# Patient Record
Sex: MALE | Race: WHITE | NOT HISPANIC OR LATINO | Employment: PART TIME | URBAN - METROPOLITAN AREA
[De-identification: names, ages, dates, MRNs, and addresses within clinical notes are randomized per-mention and may not be internally consistent; named-entity substitution may affect disease eponyms.]

---

## 2021-06-03 ENCOUNTER — RECORDS - HEALTHEAST (OUTPATIENT)
Dept: LAB | Facility: CLINIC | Age: 71
End: 2021-06-03

## 2021-06-04 LAB
ANION GAP SERPL CALCULATED.3IONS-SCNC: 7 MMOL/L (ref 5–18)
BUN SERPL-MCNC: 16 MG/DL (ref 8–28)
CALCIUM SERPL-MCNC: 8.6 MG/DL (ref 8.5–10.5)
CHLORIDE BLD-SCNC: 103 MMOL/L (ref 98–107)
CO2 SERPL-SCNC: 29 MMOL/L (ref 22–31)
CREAT SERPL-MCNC: 1.16 MG/DL (ref 0.7–1.3)
ERYTHROCYTE [DISTWIDTH] IN BLOOD BY AUTOMATED COUNT: 14.1 % (ref 11–14.5)
GFR SERPL CREATININE-BSD FRML MDRD: >60 ML/MIN/1.73M2
GLUCOSE BLD-MCNC: 93 MG/DL (ref 70–125)
HCT VFR BLD AUTO: 34.2 % (ref 40–54)
HGB BLD-MCNC: 11.3 G/DL (ref 14–18)
MCH RBC QN AUTO: 30.1 PG (ref 27–34)
MCHC RBC AUTO-ENTMCNC: 33 G/DL (ref 32–36)
MCV RBC AUTO: 91 FL (ref 80–100)
PLATELET # BLD AUTO: 337 THOU/UL (ref 140–440)
PMV BLD AUTO: 9.7 FL (ref 8.5–12.5)
POTASSIUM BLD-SCNC: 4.1 MMOL/L (ref 3.5–5)
RBC # BLD AUTO: 3.75 MILL/UL (ref 4.4–6.2)
SODIUM SERPL-SCNC: 139 MMOL/L (ref 136–145)
TSH SERPL DL<=0.005 MIU/L-ACNC: 9.93 UIU/ML (ref 0.3–5)
WBC: 7.7 THOU/UL (ref 4–11)

## 2021-06-14 ENCOUNTER — RECORDS - HEALTHEAST (OUTPATIENT)
Dept: LAB | Facility: CLINIC | Age: 71
End: 2021-06-14

## 2021-06-15 LAB
ERYTHROCYTE [DISTWIDTH] IN BLOOD BY AUTOMATED COUNT: 14.1 % (ref 11–14.5)
HCT VFR BLD AUTO: 38.1 % (ref 40–54)
HGB BLD-MCNC: 12.6 G/DL (ref 14–18)
MCH RBC QN AUTO: 30.2 PG (ref 27–34)
MCHC RBC AUTO-ENTMCNC: 33.1 G/DL (ref 32–36)
MCV RBC AUTO: 91 FL (ref 80–100)
PLATELET # BLD AUTO: 441 THOU/UL (ref 140–440)
PMV BLD AUTO: 9.9 FL (ref 8.5–12.5)
RBC # BLD AUTO: 4.17 MILL/UL (ref 4.4–6.2)
WBC: 7.5 THOU/UL (ref 4–11)

## 2023-03-22 ENCOUNTER — TRANSFERRED RECORDS (OUTPATIENT)
Dept: MULTI SPECIALTY CLINIC | Facility: CLINIC | Age: 73
End: 2023-03-22

## 2023-03-22 LAB
CREATININE (EXTERNAL): 1.09 MG/DL (ref 0.73–1.18)
GFR ESTIMATED (EXTERNAL): >60 ML/MIN/1.73M2
GLUCOSE (EXTERNAL): 137 MG/DL (ref 70–100)
HBA1C MFR BLD: 6.7 %
POTASSIUM (EXTERNAL): 3.9 MMOL/L (ref 3.5–5.1)

## 2023-03-28 RX ORDER — LEVOTHYROXINE SODIUM 175 UG/1
175 TABLET ORAL DAILY
COMMUNITY
Start: 2022-12-09

## 2023-03-28 RX ORDER — ATORVASTATIN CALCIUM 20 MG/1
20 TABLET, FILM COATED ORAL DAILY
COMMUNITY
Start: 2023-03-02

## 2023-03-28 RX ORDER — INSULIN LISPRO 100 [IU]/ML
INJECTION, SOLUTION INTRAVENOUS; SUBCUTANEOUS
COMMUNITY
Start: 2023-03-16

## 2023-03-28 RX ORDER — ACETAMINOPHEN 500 MG
500-1000 TABLET ORAL EVERY 6 HOURS PRN
COMMUNITY

## 2023-03-28 RX ORDER — IRBESARTAN 150 MG/1
1 TABLET ORAL DAILY
COMMUNITY
Start: 2023-03-02

## 2023-03-29 ENCOUNTER — ANESTHESIA EVENT (OUTPATIENT)
Dept: SURGERY | Facility: CLINIC | Age: 73
DRG: 455 | End: 2023-03-29
Payer: COMMERCIAL

## 2023-03-30 ENCOUNTER — TRANSFERRED RECORDS (OUTPATIENT)
Dept: MULTI SPECIALTY CLINIC | Facility: CLINIC | Age: 73
End: 2023-03-30

## 2023-03-30 ENCOUNTER — APPOINTMENT (OUTPATIENT)
Dept: PHYSICAL THERAPY | Facility: CLINIC | Age: 73
DRG: 455 | End: 2023-03-30
Attending: ORTHOPAEDIC SURGERY
Payer: COMMERCIAL

## 2023-03-30 ENCOUNTER — APPOINTMENT (OUTPATIENT)
Dept: RADIOLOGY | Facility: CLINIC | Age: 73
DRG: 455 | End: 2023-03-30
Attending: STUDENT IN AN ORGANIZED HEALTH CARE EDUCATION/TRAINING PROGRAM
Payer: COMMERCIAL

## 2023-03-30 ENCOUNTER — HOSPITAL ENCOUNTER (INPATIENT)
Facility: CLINIC | Age: 73
LOS: 2 days | Discharge: HOME | DRG: 455 | End: 2023-04-01
Attending: ORTHOPAEDIC SURGERY | Admitting: ORTHOPAEDIC SURGERY
Payer: COMMERCIAL

## 2023-03-30 ENCOUNTER — ANESTHESIA (OUTPATIENT)
Dept: SURGERY | Facility: CLINIC | Age: 73
DRG: 455 | End: 2023-03-30
Payer: COMMERCIAL

## 2023-03-30 ENCOUNTER — APPOINTMENT (OUTPATIENT)
Dept: RADIOLOGY | Facility: CLINIC | Age: 73
DRG: 455 | End: 2023-03-30
Attending: ORTHOPAEDIC SURGERY
Payer: COMMERCIAL

## 2023-03-30 DIAGNOSIS — Z98.890 S/P SPINAL SURGERY: Primary | ICD-10-CM

## 2023-03-30 LAB
GLUCOSE BLDC GLUCOMTR-MCNC: 100 MG/DL (ref 70–99)
GLUCOSE BLDC GLUCOMTR-MCNC: 112 MG/DL (ref 70–99)
GLUCOSE BLDC GLUCOMTR-MCNC: 174 MG/DL (ref 70–99)
GLUCOSE BLDC GLUCOMTR-MCNC: 235 MG/DL (ref 70–99)
GLUCOSE BLDC GLUCOMTR-MCNC: 297 MG/DL (ref 70–99)
GLUCOSE BLDC GLUCOMTR-MCNC: 319 MG/DL (ref 70–99)
GLUCOSE BLDC GLUCOMTR-MCNC: 356 MG/DL (ref 70–99)
GLUCOSE BLDC GLUCOMTR-MCNC: 394 MG/DL (ref 70–99)
HBA1C MFR BLD: 6.4 %

## 2023-03-30 PROCEDURE — 272N000001 HC OR GENERAL SUPPLY STERILE: Performed by: ORTHOPAEDIC SURGERY

## 2023-03-30 PROCEDURE — 250N000012 HC RX MED GY IP 250 OP 636 PS 637: Performed by: STUDENT IN AN ORGANIZED HEALTH CARE EDUCATION/TRAINING PROGRAM

## 2023-03-30 PROCEDURE — 0SG00AJ FUSION OF LUMBAR VERTEBRAL JOINT WITH INTERBODY FUSION DEVICE, POSTERIOR APPROACH, ANTERIOR COLUMN, OPEN APPROACH: ICD-10-PCS | Performed by: ORTHOPAEDIC SURGERY

## 2023-03-30 PROCEDURE — 99223 1ST HOSP IP/OBS HIGH 75: CPT | Performed by: STUDENT IN AN ORGANIZED HEALTH CARE EDUCATION/TRAINING PROGRAM

## 2023-03-30 PROCEDURE — 258N000003 HC RX IP 258 OP 636

## 2023-03-30 PROCEDURE — 250N000011 HC RX IP 250 OP 636: Performed by: ORTHOPAEDIC SURGERY

## 2023-03-30 PROCEDURE — 258N000003 HC RX IP 258 OP 636: Performed by: ANESTHESIOLOGY

## 2023-03-30 PROCEDURE — 83036 HEMOGLOBIN GLYCOSYLATED A1C: CPT | Performed by: STUDENT IN AN ORGANIZED HEALTH CARE EDUCATION/TRAINING PROGRAM

## 2023-03-30 PROCEDURE — 97116 GAIT TRAINING THERAPY: CPT | Mod: GP

## 2023-03-30 PROCEDURE — 258N000003 HC RX IP 258 OP 636: Performed by: STUDENT IN AN ORGANIZED HEALTH CARE EDUCATION/TRAINING PROGRAM

## 2023-03-30 PROCEDURE — 250N000012 HC RX MED GY IP 250 OP 636 PS 637: Performed by: NURSE ANESTHETIST, CERTIFIED REGISTERED

## 2023-03-30 PROCEDURE — 97161 PT EVAL LOW COMPLEX 20 MIN: CPT | Mod: GP

## 2023-03-30 PROCEDURE — 0SG0071 FUSION OF LUMBAR VERTEBRAL JOINT WITH AUTOLOGOUS TISSUE SUBSTITUTE, POSTERIOR APPROACH, POSTERIOR COLUMN, OPEN APPROACH: ICD-10-PCS | Performed by: ORTHOPAEDIC SURGERY

## 2023-03-30 PROCEDURE — 250N000011 HC RX IP 250 OP 636

## 2023-03-30 PROCEDURE — 120N000001 HC R&B MED SURG/OB

## 2023-03-30 PROCEDURE — 97530 THERAPEUTIC ACTIVITIES: CPT | Mod: GP

## 2023-03-30 PROCEDURE — 250N000005 HC OR RX SURGIFLO HEMOSTATIC MATRIX 10ML 199102S OPNP: Performed by: ORTHOPAEDIC SURGERY

## 2023-03-30 PROCEDURE — 999N000141 HC STATISTIC PRE-PROCEDURE NURSING ASSESSMENT: Performed by: ORTHOPAEDIC SURGERY

## 2023-03-30 PROCEDURE — 0SB20ZZ EXCISION OF LUMBAR VERTEBRAL DISC, OPEN APPROACH: ICD-10-PCS | Performed by: ORTHOPAEDIC SURGERY

## 2023-03-30 PROCEDURE — 370N000017 HC ANESTHESIA TECHNICAL FEE, PER MIN: Performed by: ORTHOPAEDIC SURGERY

## 2023-03-30 PROCEDURE — 36415 COLL VENOUS BLD VENIPUNCTURE: CPT | Performed by: STUDENT IN AN ORGANIZED HEALTH CARE EDUCATION/TRAINING PROGRAM

## 2023-03-30 PROCEDURE — C1762 CONN TISS, HUMAN(INC FASCIA): HCPCS | Performed by: ORTHOPAEDIC SURGERY

## 2023-03-30 PROCEDURE — 360N000078 HC SURGERY LEVEL 5, PER MIN: Performed by: ORTHOPAEDIC SURGERY

## 2023-03-30 PROCEDURE — 250N000013 HC RX MED GY IP 250 OP 250 PS 637

## 2023-03-30 PROCEDURE — 250N000011 HC RX IP 250 OP 636: Performed by: STUDENT IN AN ORGANIZED HEALTH CARE EDUCATION/TRAINING PROGRAM

## 2023-03-30 PROCEDURE — 250N000025 HC SEVOFLURANE, PER MIN: Performed by: ORTHOPAEDIC SURGERY

## 2023-03-30 PROCEDURE — 258N000003 HC RX IP 258 OP 636: Performed by: ORTHOPAEDIC SURGERY

## 2023-03-30 PROCEDURE — 278N000051 HC OR IMPLANT GENERAL: Performed by: ORTHOPAEDIC SURGERY

## 2023-03-30 PROCEDURE — 8E0WXBF COMPUTER ASSISTED PROCEDURE OF TRUNK REGION, WITH FLUOROSCOPY: ICD-10-PCS | Performed by: ORTHOPAEDIC SURGERY

## 2023-03-30 PROCEDURE — 250N000009 HC RX 250: Performed by: ORTHOPAEDIC SURGERY

## 2023-03-30 PROCEDURE — 250N000013 HC RX MED GY IP 250 OP 250 PS 637: Performed by: STUDENT IN AN ORGANIZED HEALTH CARE EDUCATION/TRAINING PROGRAM

## 2023-03-30 PROCEDURE — 250N000009 HC RX 250

## 2023-03-30 PROCEDURE — 999N000182 XR SURGERY CARM FLUORO GREATER THAN 5 MIN: Mod: TC

## 2023-03-30 PROCEDURE — 250N000013 HC RX MED GY IP 250 OP 250 PS 637: Performed by: ANESTHESIOLOGY

## 2023-03-30 PROCEDURE — 71045 X-RAY EXAM CHEST 1 VIEW: CPT

## 2023-03-30 PROCEDURE — C1713 ANCHOR/SCREW BN/BN,TIS/BN: HCPCS | Performed by: ORTHOPAEDIC SURGERY

## 2023-03-30 PROCEDURE — 710N000010 HC RECOVERY PHASE 1, LEVEL 2, PER MIN: Performed by: ORTHOPAEDIC SURGERY

## 2023-03-30 DEVICE — IMP SCR MEDT BREAK-OFF SET SOLERA 4.75MM TI 5440030: Type: IMPLANTABLE DEVICE | Site: SPINE LUMBAR | Status: FUNCTIONAL

## 2023-03-30 DEVICE — IMPLANTABLE DEVICE: Type: IMPLANTABLE DEVICE | Site: SPINE LUMBAR | Status: FUNCTIONAL

## 2023-03-30 DEVICE — IMP ROD MEDT 3.5CM 1475501035: Type: IMPLANTABLE DEVICE | Site: SPINE LUMBAR | Status: FUNCTIONAL

## 2023-03-30 DEVICE — SPACER 6068096 CATALYFT PL LONG 9MM
Type: IMPLANTABLE DEVICE | Site: SPINE LUMBAR | Status: FUNCTIONAL
Brand: CATALYFT PL EXPANDABLE INTERBODY SYSTEM

## 2023-03-30 DEVICE — KIT BNGF 6CC DMNR CORT FBR ACCELERATE GRFTN CNN T50206: Type: IMPLANTABLE DEVICE | Site: SPINE LUMBAR | Status: FUNCTIONAL

## 2023-03-30 DEVICE — IMP SCR MEDT 4.75MM SOLERA 6.5X55MM MA 54840006555: Type: IMPLANTABLE DEVICE | Site: SPINE LUMBAR | Status: FUNCTIONAL

## 2023-03-30 RX ORDER — VANCOMYCIN HYDROCHLORIDE 1 G/20ML
INJECTION, POWDER, LYOPHILIZED, FOR SOLUTION INTRAVENOUS PRN
Status: DISCONTINUED | OUTPATIENT
Start: 2023-03-30 | End: 2023-03-30 | Stop reason: HOSPADM

## 2023-03-30 RX ORDER — DEXTROSE MONOHYDRATE 25 G/50ML
25-50 INJECTION, SOLUTION INTRAVENOUS
Status: DISCONTINUED | OUTPATIENT
Start: 2023-03-30 | End: 2023-03-30

## 2023-03-30 RX ORDER — SODIUM CHLORIDE, SODIUM LACTATE, POTASSIUM CHLORIDE, CALCIUM CHLORIDE 600; 310; 30; 20 MG/100ML; MG/100ML; MG/100ML; MG/100ML
INJECTION, SOLUTION INTRAVENOUS CONTINUOUS
Status: DISCONTINUED | OUTPATIENT
Start: 2023-03-30 | End: 2023-03-30 | Stop reason: HOSPADM

## 2023-03-30 RX ORDER — OXYCODONE HYDROCHLORIDE 5 MG/1
10 TABLET ORAL EVERY 4 HOURS PRN
Status: DISCONTINUED | OUTPATIENT
Start: 2023-03-30 | End: 2023-04-01 | Stop reason: HOSPADM

## 2023-03-30 RX ORDER — NALOXONE HYDROCHLORIDE 0.4 MG/ML
0.4 INJECTION, SOLUTION INTRAMUSCULAR; INTRAVENOUS; SUBCUTANEOUS
Status: DISCONTINUED | OUTPATIENT
Start: 2023-03-30 | End: 2023-04-01 | Stop reason: HOSPADM

## 2023-03-30 RX ORDER — DEXAMETHASONE SODIUM PHOSPHATE 10 MG/ML
INJECTION, SOLUTION INTRAMUSCULAR; INTRAVENOUS PRN
Status: DISCONTINUED | OUTPATIENT
Start: 2023-03-30 | End: 2023-03-30

## 2023-03-30 RX ORDER — MULTIVITAMIN,THERAPEUTIC
1 TABLET ORAL DAILY
Status: DISCONTINUED | OUTPATIENT
Start: 2023-03-31 | End: 2023-04-01 | Stop reason: HOSPADM

## 2023-03-30 RX ORDER — BISACODYL 10 MG
10 SUPPOSITORY, RECTAL RECTAL DAILY PRN
Status: DISCONTINUED | OUTPATIENT
Start: 2023-03-30 | End: 2023-04-01 | Stop reason: HOSPADM

## 2023-03-30 RX ORDER — HYDROMORPHONE HCL IN WATER/PF 6 MG/30 ML
0.4 PATIENT CONTROLLED ANALGESIA SYRINGE INTRAVENOUS
Status: DISCONTINUED | OUTPATIENT
Start: 2023-03-30 | End: 2023-04-01 | Stop reason: HOSPADM

## 2023-03-30 RX ORDER — CEFAZOLIN SODIUM 2 G/100ML
2 INJECTION, SOLUTION INTRAVENOUS EVERY 8 HOURS
Status: COMPLETED | OUTPATIENT
Start: 2023-03-30 | End: 2023-03-31

## 2023-03-30 RX ORDER — ONDANSETRON 2 MG/ML
4 INJECTION INTRAMUSCULAR; INTRAVENOUS EVERY 6 HOURS PRN
Status: DISCONTINUED | OUTPATIENT
Start: 2023-03-30 | End: 2023-04-01 | Stop reason: HOSPADM

## 2023-03-30 RX ORDER — ONDANSETRON 4 MG/1
4 TABLET, ORALLY DISINTEGRATING ORAL EVERY 6 HOURS PRN
Status: DISCONTINUED | OUTPATIENT
Start: 2023-03-30 | End: 2023-04-01 | Stop reason: HOSPADM

## 2023-03-30 RX ORDER — LIDOCAINE HYDROCHLORIDE 10 MG/ML
INJECTION, SOLUTION INFILTRATION; PERINEURAL PRN
Status: DISCONTINUED | OUTPATIENT
Start: 2023-03-30 | End: 2023-03-30

## 2023-03-30 RX ORDER — OXYCODONE HYDROCHLORIDE 5 MG/1
5 TABLET ORAL EVERY 4 HOURS PRN
Status: DISCONTINUED | OUTPATIENT
Start: 2023-03-30 | End: 2023-04-01 | Stop reason: HOSPADM

## 2023-03-30 RX ORDER — METHOCARBAMOL 500 MG/1
500 TABLET, FILM COATED ORAL EVERY 6 HOURS PRN
Status: DISCONTINUED | OUTPATIENT
Start: 2023-03-30 | End: 2023-04-01 | Stop reason: HOSPADM

## 2023-03-30 RX ORDER — POLYETHYLENE GLYCOL 3350 17 G/17G
17 POWDER, FOR SOLUTION ORAL DAILY
Status: DISCONTINUED | OUTPATIENT
Start: 2023-03-31 | End: 2023-04-01 | Stop reason: HOSPADM

## 2023-03-30 RX ORDER — NALOXONE HYDROCHLORIDE 0.4 MG/ML
0.2 INJECTION, SOLUTION INTRAMUSCULAR; INTRAVENOUS; SUBCUTANEOUS
Status: DISCONTINUED | OUTPATIENT
Start: 2023-03-30 | End: 2023-04-01 | Stop reason: HOSPADM

## 2023-03-30 RX ORDER — CEFAZOLIN SODIUM 2 G/100ML
2 INJECTION, SOLUTION INTRAVENOUS SEE ADMIN INSTRUCTIONS
Status: DISCONTINUED | OUTPATIENT
Start: 2023-03-30 | End: 2023-03-30

## 2023-03-30 RX ORDER — NICOTINE POLACRILEX 4 MG
15-30 LOZENGE BUCCAL
Status: DISCONTINUED | OUTPATIENT
Start: 2023-03-30 | End: 2023-04-01 | Stop reason: HOSPADM

## 2023-03-30 RX ORDER — SODIUM CHLORIDE 9 MG/ML
INJECTION, SOLUTION INTRAVENOUS CONTINUOUS
Status: DISCONTINUED | OUTPATIENT
Start: 2023-03-30 | End: 2023-04-01 | Stop reason: HOSPADM

## 2023-03-30 RX ORDER — LORATADINE 10 MG/1
10 TABLET ORAL DAILY PRN
Status: DISCONTINUED | OUTPATIENT
Start: 2023-03-30 | End: 2023-04-01 | Stop reason: HOSPADM

## 2023-03-30 RX ORDER — HYDROMORPHONE HCL IN WATER/PF 6 MG/30 ML
0.2 PATIENT CONTROLLED ANALGESIA SYRINGE INTRAVENOUS
Status: DISCONTINUED | OUTPATIENT
Start: 2023-03-30 | End: 2023-04-01 | Stop reason: HOSPADM

## 2023-03-30 RX ORDER — ACETAMINOPHEN 325 MG/1
975 TABLET ORAL ONCE
Status: COMPLETED | OUTPATIENT
Start: 2023-03-30 | End: 2023-03-30

## 2023-03-30 RX ORDER — CEFAZOLIN SODIUM/WATER 2 G/20 ML
SYRINGE (ML) INTRAVENOUS
Status: DISCONTINUED
Start: 2023-03-30 | End: 2023-03-30 | Stop reason: HOSPADM

## 2023-03-30 RX ORDER — HYDROXYZINE HYDROCHLORIDE 10 MG/1
10 TABLET, FILM COATED ORAL EVERY 6 HOURS PRN
Status: DISCONTINUED | OUTPATIENT
Start: 2023-03-30 | End: 2023-04-01 | Stop reason: HOSPADM

## 2023-03-30 RX ORDER — IRBESARTAN 150 MG/1
150 TABLET ORAL DAILY
Status: DISCONTINUED | OUTPATIENT
Start: 2023-03-31 | End: 2023-04-01 | Stop reason: HOSPADM

## 2023-03-30 RX ORDER — NICOTINE POLACRILEX 4 MG
15-30 LOZENGE BUCCAL
Status: DISCONTINUED | OUTPATIENT
Start: 2023-03-30 | End: 2023-03-30

## 2023-03-30 RX ORDER — CEFAZOLIN SODIUM/WATER 2 G/20 ML
SYRINGE (ML) INTRAVENOUS PRN
Status: DISCONTINUED | OUTPATIENT
Start: 2023-03-30 | End: 2023-03-30

## 2023-03-30 RX ORDER — ONDANSETRON 2 MG/ML
INJECTION INTRAMUSCULAR; INTRAVENOUS PRN
Status: DISCONTINUED | OUTPATIENT
Start: 2023-03-30 | End: 2023-03-30

## 2023-03-30 RX ORDER — PROPOFOL 10 MG/ML
INJECTION, EMULSION INTRAVENOUS CONTINUOUS PRN
Status: DISCONTINUED | OUTPATIENT
Start: 2023-03-30 | End: 2023-03-30

## 2023-03-30 RX ORDER — PROCHLORPERAZINE MALEATE 5 MG
5 TABLET ORAL EVERY 6 HOURS PRN
Status: DISCONTINUED | OUTPATIENT
Start: 2023-03-30 | End: 2023-04-01 | Stop reason: HOSPADM

## 2023-03-30 RX ORDER — AMOXICILLIN 250 MG
1 CAPSULE ORAL 2 TIMES DAILY
Status: DISCONTINUED | OUTPATIENT
Start: 2023-03-30 | End: 2023-04-01 | Stop reason: HOSPADM

## 2023-03-30 RX ORDER — ATORVASTATIN CALCIUM 10 MG/1
20 TABLET, FILM COATED ORAL DAILY
Status: DISCONTINUED | OUTPATIENT
Start: 2023-03-30 | End: 2023-04-01 | Stop reason: HOSPADM

## 2023-03-30 RX ORDER — LIDOCAINE 40 MG/G
CREAM TOPICAL
Status: DISCONTINUED | OUTPATIENT
Start: 2023-03-30 | End: 2023-03-30 | Stop reason: HOSPADM

## 2023-03-30 RX ORDER — CEFAZOLIN SODIUM 2 G/100ML
2 INJECTION, SOLUTION INTRAVENOUS
Status: DISCONTINUED | OUTPATIENT
Start: 2023-03-30 | End: 2023-03-30

## 2023-03-30 RX ORDER — ACETAMINOPHEN 325 MG/1
975 TABLET ORAL EVERY 8 HOURS
Status: DISCONTINUED | OUTPATIENT
Start: 2023-03-30 | End: 2023-04-01 | Stop reason: HOSPADM

## 2023-03-30 RX ORDER — CARBOXYMETHYLCELLULOSE SODIUM 10 MG/ML
GEL OPHTHALMIC PRN
Status: DISCONTINUED | OUTPATIENT
Start: 2023-03-30 | End: 2023-03-30

## 2023-03-30 RX ORDER — DEXTROSE MONOHYDRATE 25 G/50ML
25-50 INJECTION, SOLUTION INTRAVENOUS
Status: DISCONTINUED | OUTPATIENT
Start: 2023-03-30 | End: 2023-03-30 | Stop reason: HOSPADM

## 2023-03-30 RX ORDER — BUPIVACAINE HYDROCHLORIDE AND EPINEPHRINE 2.5; 5 MG/ML; UG/ML
INJECTION, SOLUTION EPIDURAL; INFILTRATION; INTRACAUDAL; PERINEURAL PRN
Status: DISCONTINUED | OUTPATIENT
Start: 2023-03-30 | End: 2023-03-30 | Stop reason: HOSPADM

## 2023-03-30 RX ORDER — ACETAMINOPHEN 325 MG/1
650 TABLET ORAL EVERY 4 HOURS PRN
Status: DISCONTINUED | OUTPATIENT
Start: 2023-04-02 | End: 2023-04-01 | Stop reason: HOSPADM

## 2023-03-30 RX ORDER — NICOTINE POLACRILEX 4 MG
15-30 LOZENGE BUCCAL
Status: DISCONTINUED | OUTPATIENT
Start: 2023-03-30 | End: 2023-03-30 | Stop reason: HOSPADM

## 2023-03-30 RX ORDER — DEXTROSE MONOHYDRATE 25 G/50ML
25-50 INJECTION, SOLUTION INTRAVENOUS
Status: DISCONTINUED | OUTPATIENT
Start: 2023-03-30 | End: 2023-04-01 | Stop reason: HOSPADM

## 2023-03-30 RX ORDER — PROPOFOL 10 MG/ML
INJECTION, EMULSION INTRAVENOUS PRN
Status: DISCONTINUED | OUTPATIENT
Start: 2023-03-30 | End: 2023-03-30

## 2023-03-30 RX ADMIN — SODIUM CHLORIDE: 9 INJECTION, SOLUTION INTRAVENOUS at 13:42

## 2023-03-30 RX ADMIN — PHENYLEPHRINE HYDROCHLORIDE 100 MCG: 10 INJECTION INTRAVENOUS at 09:03

## 2023-03-30 RX ADMIN — PHENYLEPHRINE HYDROCHLORIDE 100 MCG: 10 INJECTION INTRAVENOUS at 08:10

## 2023-03-30 RX ADMIN — HYDROXYZINE HYDROCHLORIDE 10 MG: 10 TABLET, FILM COATED ORAL at 23:09

## 2023-03-30 RX ADMIN — LIDOCAINE HYDROCHLORIDE 2 ML: 10 INJECTION, SOLUTION INFILTRATION; PERINEURAL at 10:57

## 2023-03-30 RX ADMIN — ROCURONIUM BROMIDE 50 MG: 10 INJECTION, SOLUTION INTRAVENOUS at 07:44

## 2023-03-30 RX ADMIN — PHENYLEPHRINE HYDROCHLORIDE 100 MCG: 10 INJECTION INTRAVENOUS at 09:33

## 2023-03-30 RX ADMIN — CARBOXYMETHYLCELLULOSE SODIUM 2 DROP: 10 GEL OPHTHALMIC at 07:44

## 2023-03-30 RX ADMIN — TRANEXAMIC ACID 1 G: 1 INJECTION, SOLUTION INTRAVENOUS at 07:39

## 2023-03-30 RX ADMIN — CEFAZOLIN SODIUM 2 G: 2 INJECTION, SOLUTION INTRAVENOUS at 19:05

## 2023-03-30 RX ADMIN — SODIUM CHLORIDE, POTASSIUM CHLORIDE, SODIUM LACTATE AND CALCIUM CHLORIDE: 600; 310; 30; 20 INJECTION, SOLUTION INTRAVENOUS at 08:04

## 2023-03-30 RX ADMIN — DEXAMETHASONE SODIUM PHOSPHATE 5 MG: 10 INJECTION, SOLUTION INTRAMUSCULAR; INTRAVENOUS at 07:44

## 2023-03-30 RX ADMIN — ROCURONIUM BROMIDE 20 MG: 10 INJECTION, SOLUTION INTRAVENOUS at 08:12

## 2023-03-30 RX ADMIN — PHENYLEPHRINE HYDROCHLORIDE 100 MCG: 10 INJECTION INTRAVENOUS at 10:22

## 2023-03-30 RX ADMIN — LIDOCAINE HYDROCHLORIDE 4 ML: 10 INJECTION, SOLUTION INFILTRATION; PERINEURAL at 07:44

## 2023-03-30 RX ADMIN — Medication 2 G: at 11:30

## 2023-03-30 RX ADMIN — PHENYLEPHRINE HYDROCHLORIDE 100 MCG: 10 INJECTION INTRAVENOUS at 10:28

## 2023-03-30 RX ADMIN — ROCURONIUM BROMIDE 10 MG: 10 INJECTION, SOLUTION INTRAVENOUS at 09:29

## 2023-03-30 RX ADMIN — SUGAMMADEX 200 MG: 100 INJECTION, SOLUTION INTRAVENOUS at 10:54

## 2023-03-30 RX ADMIN — PHENYLEPHRINE HYDROCHLORIDE 100 MCG: 10 INJECTION INTRAVENOUS at 08:37

## 2023-03-30 RX ADMIN — LEVOTHYROXINE SODIUM 175 MCG: 0.05 TABLET ORAL at 15:08

## 2023-03-30 RX ADMIN — PHENYLEPHRINE HYDROCHLORIDE 100 MCG: 10 INJECTION INTRAVENOUS at 09:21

## 2023-03-30 RX ADMIN — OXYCODONE HYDROCHLORIDE 5 MG: 5 TABLET ORAL at 13:35

## 2023-03-30 RX ADMIN — ACETAMINOPHEN 975 MG: 325 TABLET ORAL at 15:09

## 2023-03-30 RX ADMIN — MIDAZOLAM 2 MG: 1 INJECTION INTRAMUSCULAR; INTRAVENOUS at 07:38

## 2023-03-30 RX ADMIN — PHENYLEPHRINE HYDROCHLORIDE 100 MCG: 10 INJECTION INTRAVENOUS at 08:36

## 2023-03-30 RX ADMIN — SODIUM CHLORIDE, POTASSIUM CHLORIDE, SODIUM LACTATE AND CALCIUM CHLORIDE: 600; 310; 30; 20 INJECTION, SOLUTION INTRAVENOUS at 07:01

## 2023-03-30 RX ADMIN — OXYCODONE HYDROCHLORIDE 5 MG: 5 TABLET ORAL at 19:11

## 2023-03-30 RX ADMIN — ACETAMINOPHEN 975 MG: 325 TABLET ORAL at 06:57

## 2023-03-30 RX ADMIN — PHENYLEPHRINE HYDROCHLORIDE 100 MCG: 10 INJECTION INTRAVENOUS at 09:09

## 2023-03-30 RX ADMIN — PHENYLEPHRINE HYDROCHLORIDE 100 MCG: 10 INJECTION INTRAVENOUS at 10:13

## 2023-03-30 RX ADMIN — SENNOSIDES AND DOCUSATE SODIUM 1 TABLET: 50; 8.6 TABLET ORAL at 20:02

## 2023-03-30 RX ADMIN — ONDANSETRON 4 MG: 2 INJECTION INTRAMUSCULAR; INTRAVENOUS at 10:33

## 2023-03-30 RX ADMIN — SODIUM CHLORIDE, POTASSIUM CHLORIDE, SODIUM LACTATE AND CALCIUM CHLORIDE: 600; 310; 30; 20 INJECTION, SOLUTION INTRAVENOUS at 10:51

## 2023-03-30 RX ADMIN — PROPOFOL 150 MG: 10 INJECTION, EMULSION INTRAVENOUS at 07:44

## 2023-03-30 RX ADMIN — INSULIN GLARGINE 25 UNITS: 100 INJECTION, SOLUTION SUBCUTANEOUS at 20:48

## 2023-03-30 RX ADMIN — ATORVASTATIN CALCIUM 20 MG: 10 TABLET, FILM COATED ORAL at 15:09

## 2023-03-30 RX ADMIN — HYDROMORPHONE HYDROCHLORIDE 0.5 MG: 1 INJECTION, SOLUTION INTRAMUSCULAR; INTRAVENOUS; SUBCUTANEOUS at 08:29

## 2023-03-30 RX ADMIN — ROCURONIUM BROMIDE 30 MG: 10 INJECTION, SOLUTION INTRAVENOUS at 08:16

## 2023-03-30 RX ADMIN — OXYCODONE HYDROCHLORIDE 5 MG: 5 TABLET ORAL at 23:09

## 2023-03-30 RX ADMIN — ACETAMINOPHEN 975 MG: 325 TABLET ORAL at 23:09

## 2023-03-30 RX ADMIN — INSULIN ASPART 2 UNITS: 100 INJECTION, SOLUTION INTRAVENOUS; SUBCUTANEOUS at 11:02

## 2023-03-30 RX ADMIN — HYDROMORPHONE HYDROCHLORIDE 0.5 MG: 1 INJECTION, SOLUTION INTRAMUSCULAR; INTRAVENOUS; SUBCUTANEOUS at 08:02

## 2023-03-30 RX ADMIN — PROPOFOL 100 MCG/KG/MIN: 10 INJECTION, EMULSION INTRAVENOUS at 07:49

## 2023-03-30 RX ADMIN — INSULIN ASPART 2 UNITS: 100 INJECTION, SOLUTION INTRAVENOUS; SUBCUTANEOUS at 17:20

## 2023-03-30 RX ADMIN — Medication 2 G: at 07:35

## 2023-03-30 RX ADMIN — PHENYLEPHRINE HYDROCHLORIDE 100 MCG: 10 INJECTION INTRAVENOUS at 08:18

## 2023-03-30 ASSESSMENT — ACTIVITIES OF DAILY LIVING (ADL)
ADLS_ACUITY_SCORE: 21
ADLS_ACUITY_SCORE: 22
ADLS_ACUITY_SCORE: 21
ADLS_ACUITY_SCORE: 22
ADLS_ACUITY_SCORE: 22
ADLS_ACUITY_SCORE: 21
ADLS_ACUITY_SCORE: 22

## 2023-03-30 NOTE — PROVIDER NOTIFICATION
Notified Dr Chau of blood sugar of 235. No orders in for BS correction. Requesting orders for insulin. Awaiting for orders.

## 2023-03-30 NOTE — PROGRESS NOTES
03/30/23 0729   Appointment Info   Signing Clinician's Name / Credentials (PT) Elinor Romero, PT, DPT   Living Environment   People in Home alone   Current Living Arrangements mobile home   Home Accessibility stairs to enter home;stairs within home   Number of Stairs, Main Entrance 5   Stair Railings, Main Entrance railings safe and in good condition;railings on both sides of stairs   Number of Stairs, Within Home, Primary one   Stair Railings, Within Home, Primary railings safe and in good condition;railing on left side (ascending)   Transportation Anticipated family or friend will provide   Self-Care   Usual Activity Tolerance excellent   Current Activity Tolerance good   Equipment Currently Used at Home none  (Has FWW at home)   Fall history within last six months no   Activity/Exercise/Self-Care Comment Patient reports previous independence with mobility and ADLs. Works 3 jobs, does drive. Does own cooking, cleaning, and laundry.   General Information   Onset of Illness/Injury or Date of Surgery 03/30/23   Referring Physician Buzz Jara MD   Patient/Family Therapy Goals Statement (PT) To go home   Pertinent History of Current Problem (include personal factors and/or comorbidities that impact the POC) Status post L4-5 fusion   Existing Precautions/Restrictions fall;spinal   Weight-Bearing Status - LLE weight-bearing as tolerated   Weight-Bearing Status - RLE weight-bearing as tolerated   Cognition   Affect/Mental Status (Cognition) WNL   Orientation Status (Cognition) oriented x 4   Follows Commands (Cognition) WNL   Range of Motion (ROM)   Range of Motion ROM is WNL   Strength (Manual Muscle Testing)   Strength (Manual Muscle Testing) strength is WNL   Bed Mobility   Bed Mobility supine-sit   Supine-Sit [place] (Bed Mobility) verbal cues;supervision   Transfers   Transfers sit-stand transfer   Sit-Stand Transfer   Sit-Stand [place] (Transfers) contact guard;1 person to manage equipment    Assistive Device (Sit-Stand Transfers) walker, front-wheeled   Gait/Stairs (Locomotion)   [place] Level (Gait) supervision   Assistive Device (Gait) walker, front-wheeled   Distance in Feet 10   Distance in Feet (Gait) 315   Pattern (Gait) step-through   Deviations/Abnormal Patterns (Gait) gait speed decreased   Clinical Impression   Criteria for Skilled Therapeutic Intervention Yes, treatment indicated   PT Diagnosis (PT) Impaired functional mobility   Influenced by the following impairments Post op status   Functional limitations due to impairments Bed mobility, transfers, gait, stairs   Clinical Presentation (PT Evaluation Complexity) Evolving/Changing   Clinical Presentation Rationale Patient presents as medically diagnosed   Clinical Decision Making (Complexity) low complexity   Planned Therapy Interventions (PT) bed mobility training;gait training;patient/family education;stair training;transfer training   Anticipated Equipment Needs at Discharge (PT) walker, rolling   Risk & Benefits of therapy have been explained evaluation/treatment results reviewed;care plan/treatment goals reviewed;participants voiced agreement with care plan;participants included;patient   PT Total Evaluation Time   PT Eval, Low Complexity Minutes (29163) 10   Physical Therapy Goals   PT Frequency 2x/day   PT Predicted Duration/Target Date for Goal Attainment 04/06/23   PT Goals Transfers;Gait;Stairs   PT: Transfers Modified independent;Sit to/from stand;Bed to/from chair;Assistive device;Within precautions   PT: Gait Modified independent;Assistive device;Greater than 200 feet   PT: Stairs Supervision/stand-by assist;5 stairs;Rail on both sides   Interventions   Interventions Quick Adds Gait Training;Therapeutic Activity   Therapeutic Activity   Therapeutic Activities: dynamic activities to improve functional performance Minutes (78832) 8   Symptoms Noted During/After Treatment None   Treatment Detail/Skilled Intervention Patient  supine in bed on arrival. Educated patient on spinal precautions and weight bearing status. Verbal cueing for hand placement and sequencing for bed mobility, assistance with line and linen management. Upon sitting, PT noticed patient's drain had become disconnected, notified RN. Sitting EOB with SBA x 1 with no LOB noted. Stand to sit with SBA x 1 and FWW. Verbal cueing for hand placement and to maintain spinal precautions. Sit to supine with SBA x 1, verbal cueing for hand placement and sequencing. Patient supine in bed with call light within reach and bed alarm armed at end of session.   Gait Training   Gait Training Minutes (44516) 8   Symptoms Noted During/After Treatment (Gait Training) none   Treatment Detail/Skilled Intervention Patient ambulated in hallway with SBA x 1 and FWW, assistance with line management. No LOB noted during gait, demonstrated decreased gait speed.   [place] Level (Gait Training) stand-by assist   Physical Assistance Level (Gait Training) supervision   Weight Bearing (Gait Training) weight-bearing as tolerated   Assistive Device (Gait Training) rolling walker   Gait Analysis Deviations decreased zulema   PT Discharge Planning   PT Plan Bed mobility, transfers, gait, stairs   PT Discharge Recommendation (DC Rec) home;home with assist   PT Rationale for DC Rec Patient currently requires SBA for bed mobility, transfers, and gait. He lives in a mobile home alone and his son will be staying with him upon discharge from the hospital. He has 5 stairs to enter his home; at this time he has not attempted stairs. Anticipate patient will be safe to discharge home with assist from family once medically cleared to do so.   PT Brief overview of current status SBA for bed mobility, transfers, and gait.   Total Session Time   Timed Code Treatment Minutes 16   Total Session Time (sum of timed and untimed services) 26     Elinor Romero, PT, DPT

## 2023-03-30 NOTE — PROVIDER NOTIFICATION
Notified again of increased blood sugar of 297. Orders have been placed for insulin. See MAR for medications given. Also given verbal order for 500 mL bolus of fluids from Dr Chau.

## 2023-03-30 NOTE — PHARMACY-ADMISSION MEDICATION HISTORY
Pharmacy Note - Admission Medication History    Pertinent Provider Information: n/a   ______________________________________________________________________    Prior To Admission (PTA) med list completed and updated in EMR.       PTA Med List   Medication Sig Note Last Dose     acetaminophen (TYLENOL) 500 MG tablet Take 500-1,000 mg by mouth every 6 hours as needed for mild pain  3/14/2023     aspirin (ASA) 81 MG EC tablet Take 81 mg by mouth daily  3/27/2023     atorvastatin (LIPITOR) 20 MG tablet Take 20 mg by mouth daily  3/29/2023 at am     insulin lispro (HUMALOG) 100 UNIT/ML vial Used in insulin pump 3/30/2023: Typically 55-65 units/day      irbesartan (AVAPRO) 150 MG tablet Take 1 tablet by mouth daily 3/28/2023: Hold AM of surgery per H&P 3/29/2023 at am     levothyroxine (SYNTHROID/LEVOTHROID) 175 MCG tablet Take 175 mcg by mouth daily  3/29/2023 at am     Information source(s): Patient    Method of interview communication: in-person    Patient was asked about OTC/herbal products specifically.  PTA med list reflects this.    Based on the pharmacist's assessment, the PTA med list information appears reliable    Medication Affordability:  Not including over the counter (OTC) medications, was there a time in the past 12 months when you did not take your medications as prescribed because of cost?: No    Allergies were reviewed, assessed, and updated with the patient.      Patient does not anticipate needing any multi-use medications during admission.     Thank you for the opportunity to participate in the care of this patient.    Melissa Mckinney, PharmD, BCPS     3/30/2023     7:01 AM

## 2023-03-30 NOTE — ANESTHESIA PROCEDURE NOTES
Airway         Procedure Start/Stop Times: 3/30/2023 7:45 AM  Staff -        CRNA: Nara Srivastava APRN CRNA       Performed By: anesthesiologist  Consent for Airway        Urgency: elective  Indications and Patient Condition       Indications for airway management: ishan-procedural and airway protection       Induction type:intravenous       Mask difficulty assessment: 1 - vent by mask    Final Airway Details       Final airway type: endotracheal airway       Successful airway: ETT - single  Endotracheal Airway Details        ETT size (mm): 8.0       Cuffed: yes       Cuff volume (mL): 5       Successful intubation technique: direct laryngoscopy       DL Blade Type: MAC 4       Grade View of Cords: 2 (Long)       Adjucts: bougie       Position: Right       Measured from: lips       Secured at (cm): 23       Bite block used: Soft    Post intubation assessment        Placement verified by: capnometry, equal breath sounds and chest rise        Number of attempts at approach: 1       Number of other approaches attempted: 0       Secured with: silk tape       Ease of procedure: easy       Dentition: Intact and Unchanged    Medication(s) Administered   Medication Administration Time: 3/30/2023 7:45 AM

## 2023-03-30 NOTE — ANESTHESIA PREPROCEDURE EVALUATION
Anesthesia Pre-Procedure Evaluation    Patient: Kraig Chan   MRN: [id number] : 1950        Procedure : Procedure(s):  RIGHT LUMBAR 4-5 TRANSFORAMINAL LUMBAR INTERBODY FUSION WITH LAMINECTOMY USING STEALTH NAVIGATION          Past Medical History:   Diagnosis Date     Arthritis      Diabetes (H)      Other chronic pain      Thyroid disease       Past Surgical History:   Procedure Laterality Date     EYE SURGERY       ORTHOPEDIC SURGERY        Allergies   Allergen Reactions     Sulfa Drugs Unknown      Social History     Tobacco Use     Smoking status: Never     Smokeless tobacco: Never   Substance Use Topics     Alcohol use: Never      Wt Readings from Last 1 Encounters:   23 81.8 kg (180 lb 6.4 oz)        Anesthesia Evaluation   Pt has had prior anesthetic.     No history of anesthetic complications       ROS/MED HX  ENT/Pulmonary:  - neg pulmonary ROS     Neurologic:  - neg neurologic ROS     Cardiovascular:  - neg cardiovascular ROS     METS/Exercise Tolerance:     Hematologic:     (+) anemia (mild, 12.8 at last check),     Musculoskeletal:       GI/Hepatic:  - neg GI/hepatic ROS     Renal/Genitourinary:  - neg Renal ROS     Endo:     (+) type I DM (has insulin pump (but pump site and CGM monitor are placed on lower abdomen, will need to be removed for procedure)), thyroid problem,     Psychiatric/Substance Use:       Infectious Disease:       Malignancy:       Other:      (+) , H/O Chronic Pain (not on opioids per patient - only takes tylenol),        Physical Exam    Airway        Mallampati: II   TM distance: > 3 FB   Neck ROM: full   Mouth opening: > 3 cm    Respiratory Devices and Support         Dental       (+) Minor Abnormalities - some fillings, tiny chips      Cardiovascular          Rhythm and rate: regular and normal     Pulmonary           breath sounds clear to auscultation           OUTSIDE LABS:  CBC:   Lab Results   Component Value Date    WBC 7.5 06/15/2021    WBC 7.7  06/04/2021    HGB 12.6 (L) 06/15/2021    HGB 11.3 (L) 06/04/2021    HCT 38.1 (L) 06/15/2021    HCT 34.2 (L) 06/04/2021     (H) 06/15/2021     06/04/2021     BMP:   Lab Results   Component Value Date     06/04/2021    POTASSIUM 4.1 06/04/2021    CHLORIDE 103 06/04/2021    CO2 29 06/04/2021    BUN 16 06/04/2021    CR 1.16 06/04/2021    GLC 93 06/04/2021     COAGS: No results found for: PTT, INR, FIBR  POC: No results found for: BGM, HCG, HCGS  HEPATIC: No results found for: ALBUMIN, PROTTOTAL, ALT, AST, GGT, ALKPHOS, BILITOTAL, BILIDIRECT, SHITAL  OTHER:   Lab Results   Component Value Date    ELLIE 8.6 06/04/2021    TSH 9.93 (H) 06/04/2021       Anesthesia Plan    ASA Status:  2   NPO Status:  NPO Appropriate    Anesthesia Type: General.     - Airway: ETT   Induction: Intravenous.           Consents    Anesthesia Plan(s) and associated risks, benefits, and realistic alternatives discussed. Questions answered and patient/representative(s) expressed understanding.     - Discussed: Risks, Benefits and Alternatives for the PROCEDURE were discussed     - Discussed with:  Patient         Postoperative Care    Pain management: IV analgesics, Oral pain medications.   PONV prophylaxis: Ondansetron (or other 5HT-3), Dexamethasone or Solumedrol     Comments:    Other Comments: BG check q 1 hr, insulin drip if correction needed            Arjun Chau MD

## 2023-03-30 NOTE — PLAN OF CARE
Patient vital signs are at baseline: Yes  Patient able to ambulate as they were prior to admission or with assist devices provided by therapies during their stay:  Yes  Patient MUST void prior to discharge:  No,  Reason:  Donahue removed - due to void  Patient able to tolerate oral intake:  Yes  Pain has adequate pain control using Oral analgesics:  Yes  Does patient have an identified :  Yes  Has goal D/C date and time been discussed with patient:  Yes    Walked in the madsen with PT. Pain controlled with scheduled Tylenol, prn Oxycodone, and ice packs. Tolerating regular diet. Donahue removed this afternoon. Due to void.

## 2023-03-30 NOTE — ANESTHESIA CARE TRANSFER NOTE
Patient: Kraig Chan    Procedure: Procedure(s):  RIGHT LUMBAR 4-5 TRANSFORAMINAL LUMBAR INTERBODY FUSION WITH LAMINECTOMY USING STEALTH NAVIGATION       Diagnosis: Lumbar spinal stenosis [M48.061]  Spondylolisthesis [M43.10]  Diagnosis Additional Information: No value filed.    Anesthesia Type:   General     Note:    Oropharynx: oropharynx clear of all foreign objects  Level of Consciousness: drowsy  Oxygen Supplementation: face mask  Level of Supplemental Oxygen (L/min / FiO2): 8  Independent Airway: airway patency satisfactory and stable  Dentition: dentition unchanged  Vital Signs Stable: post-procedure vital signs reviewed and stable  Report to RN Given: handoff report given  Patient transferred to: PACU    Handoff Report: Identifed the Patient, Identified the Reponsible Provider, Reviewed the pertinent medical history, Discussed the surgical course, Reviewed Intra-OP anesthesia mangement and issues during anesthesia, Set expectations for post-procedure period and Allowed opportunity for questions and acknowledgement of understanding      Vitals:  Vitals Value Taken Time   /78 03/30/23 1144   Temp 36.4  C (97.5  F) 03/30/23 1144   Pulse 88 03/30/23 1145   Resp 12 03/30/23 1145   SpO2 100 % 03/30/23 1145   Vitals shown include unvalidated device data.    Electronically Signed By: ANUJ Lopez CRNA  March 30, 2023  11:47 AM

## 2023-03-30 NOTE — ANESTHESIA POSTPROCEDURE EVALUATION
Patient: Kraig Chan    Procedure: Procedure(s):  RIGHT LUMBAR 4-5 TRANSFORAMINAL LUMBAR INTERBODY FUSION WITH LAMINECTOMY USING STEALTH NAVIGATION       Anesthesia Type:  General    Note:     Postop Pain Control: Uneventful            Sign Out: Well controlled pain   PONV: No   Neuro/Psych: Uneventful            Sign Out: Acceptable/Baseline neuro status   Airway/Respiratory: Uneventful            Sign Out: Acceptable/Baseline resp. status   CV/Hemodynamics: Uneventful            Sign Out: Acceptable CV status; No obvious hypovolemia; No obvious fluid overload   Other NRE: NONE   DID A NON-ROUTINE EVENT OCCUR? No    Event details/Postop Comments:  Patient prefers to use sliding-scale insulin for now, instead of re-applying insulin pump.           Last vitals:  Vitals Value Taken Time   /58 03/30/23 1240   Temp 36.4  C (97.5  F) 03/30/23 1144   Pulse 101 03/30/23 1249   Resp 24 03/30/23 1249   SpO2 96 % 03/30/23 1249   Vitals shown include unvalidated device data.    Electronically Signed By: Arjun Chau MD  March 30, 2023  1:23 PM

## 2023-03-30 NOTE — OP NOTE
DATE OF SERVICE: 03/30/2023     PREOPERATIVE DIAGNOSES:   1.  Severe spinal stenosis at L4-5  2.  L4-5 spondylolisthesis   3.  Failure of conservative measures      POSTOPERATIVE DIAGNOSES:   1.  Severe spinal stenosis at L4-5  2.  L4-5 spondylolisthesis   3.  Failure of conservative measures      PROCEDURE:   1. Right-sided transforaminal/transfacet decompression of the exiting L4 and traversing L5 nerve roots.   2. Transforaminal lumbar interbody fusion L4-5 with autograft bone, Medtronic Catalyft interbody long 9  3. Posterior lateral fusion on the left at L4-5.   4. Segmental pedicle screw fixation in the pedicles of L4 and L5 bilaterally  5. Complete laminectomy at L4-5  6. O arm with intra operative navigation     [employer] navigated pedicle screws systems.      SURGEON: Dr. Buzz Jara.      ASSISTANT: CINDY Fajardo, who was needed for patient positioning, soft tissue retraction, patient safety and assistance with closure of the wound.  He was vital in the protection of the nerve roots as well as the dura.  This could only be performed by a surgical PA trained in spine     ESTIMATED BLOOD LOSS: 100 mL     DRAINS: Hemovac      COMPLICATIONS: None perceived.      SPECIMENS SENT: None.      HISTORY OF PRESENT ILLNESS AND INDICATIONS FOR PROCEDURE:   This is a pleasant 72 year old male that presented with severe buttock and leg pain.  MRI demonstrated L4-5 severe central stenosis with L4-5 spondylolisthesis and L4-5 degenerative disc disease.  He underwent non-operative treatment with injections as well as therapy with no improvement long term.  We discussed because of this that he was a candidate for surgery.  I discussed that a fusion would give him more reliable results given spondylolisthesis.  He wished to proceed with the fusion.  We discussed the risks and benefits which include but are not limited to bleeding, infection, damage to the nerve or dura, failure of procedure to provide pain  relief, foot drop, iatrogenic instability, pseudoarthrosis, need for additional procedures, and risks associated with anesthesia.  The intention of the surgery is to treat leg pain and will not reliably treat any back pain. He was completely clear on this.  Following this, they would like to proceed with the fusion.       DESCRIPTION OF PROCEDURE     Therefore, the patient was brought to the operating room. He was intubated via general endotracheal anesthetic and placed on a Naman table with a Juanjo frame, care taken to pad all of her bony prominences. Pause for the Cause was performed correctly identifying the patient, procedure, proposed plan and radiographs and all were in agreement. We then dissected down over the L4 hemilamina bilaterally. We dissected over the facet joints at L4-5 bilaterally.  In addition to this, the transverse process was visualized at the L4 and L5 level.  O arm was brought in and a tracer was placed on the L5 spinous process.  An intraoperative CT scan was taken.       We then turned our attention to placing pedicle screws, first using a [place] bur, then a pedicle probe, then a Delgado probe to palpate the cannulated pedicle. We then tapped the holes to 5.5 mm.  We then used the Delgado probe again to palpate all 4 quadrants of our cannulated pedicle. We then placed 6.5x55 screws at L4 and 6.5x55 screws in L5.   A subsequent O arm spin was performed and demonstrated the screws in good position.     We then turned our attention to the decompression and interbody at L4-5.  A laminotomy was made.  The right L4 pars was scored allowing for the removal of the descending articular process of L4 along with the ascending articular process of L5. This gave us our transforaminal/transfacet decompression of the exiting L4, traversing L5 nerve root.      At the disk space level, we performed a box annulotomy at L4-5, used a combination of ODC curettes, pituitary rongeurs and Kerrison rongeurs to  remove the disk material. We took great care not to dig into the endplates. When all of the disk material that we could remove was taken out, we gently scored the endplates and placed autograft bone. We then placed the interbody graft,  a 27 mm length and 9 height Medtronic Catalyft cage. This had good fit and fill.     We then performed a complete laminectomy and medial facetectomy.  Thick ligamentum and epidural fat was encountered and removed in a piecemeal fashion to completely free the left lateral recess at L4-5.          We then decorticated our contralateral facet joint at L4-5 as well as the transverse processes and placed additional autograft in the defect.  This was mixed with a medium [place] biologic and additional autograft to assist in posterolateral fusion.       We then placed our connectors and rods and tightened to the 's specifications. We irrigated the wound copiously, had good hemostasis. Vancomycin was placed over the hardware.  The fascia was closed with #1 Vicryl, subcutaneous tissue with 2-0 Vicryl, skin with a 3-0 monocryl.  A sterile dressing was applied. The patient tolerated the procedure well. There were no apparent complications.      He will be weightbearing as tolerated bilateral lower extremities with strict instructions to avoid bending, lifting and twisting over the next 6 weeks. They may have a corset type brace for comfort and have early mobilization and MONIK stockings for DVT prophylaxis. He will have 2 grams of Ancef IV q.8 hours x2 doses for antibiotics or until drain is removed. Return to see me in 2 weeks, sooner if there are any problems, questions or concerns.     Hardware used:  Medtronic Solera screws and rods with end caps  Medtronic Catalyft cage  Harjinder
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
## 2023-03-30 NOTE — PLAN OF CARE
"Goal Outcome Evaluation:         Patient alert and orientated to room, phone, call light.  Patient reported pain in back 5, given prn 5mg oxycodone with relief, able to rest post.  Patient's oxygen saturations decreased to 80s on room air during nap, placed on 2L per NC and oxygen saturations increased to 90s.  Instructed on IS and encouraged when awake.  Patient reports bilateral foot numbness, tingling at baseline, able to lift, move legs.  Patient reports having type 1 diabetes, Dr. Mills following, plan sliding scale insulin.  Patient's back dressing clean, dry, intact.  Patient's daughter at bedside.  VSS, moste recent: BP (!) 144/78 (BP Location: Right arm)   Pulse 98   Temp 97.8  F (36.6  C) (Oral)   Resp 18   Ht 1.88 m (6' 2\")   Wt 81.8 kg (180 lb 6.4 oz)   SpO2 99%   BMI 23.16 kg/m   on 2L per nasal cannula.                 "

## 2023-03-31 ENCOUNTER — APPOINTMENT (OUTPATIENT)
Dept: OCCUPATIONAL THERAPY | Facility: CLINIC | Age: 73
DRG: 455 | End: 2023-03-31
Attending: ORTHOPAEDIC SURGERY
Payer: COMMERCIAL

## 2023-03-31 ENCOUNTER — APPOINTMENT (OUTPATIENT)
Dept: PHYSICAL THERAPY | Facility: CLINIC | Age: 73
DRG: 455 | End: 2023-03-31
Attending: ORTHOPAEDIC SURGERY
Payer: COMMERCIAL

## 2023-03-31 LAB
GLUCOSE BLDC GLUCOMTR-MCNC: 218 MG/DL (ref 70–99)
GLUCOSE BLDC GLUCOMTR-MCNC: 272 MG/DL (ref 70–99)
GLUCOSE BLDC GLUCOMTR-MCNC: 295 MG/DL (ref 70–99)
GLUCOSE BLDC GLUCOMTR-MCNC: 307 MG/DL (ref 70–99)
GLUCOSE BLDC GLUCOMTR-MCNC: 341 MG/DL (ref 70–99)
HGB BLD-MCNC: 11 G/DL (ref 13.3–17.7)

## 2023-03-31 PROCEDURE — 97110 THERAPEUTIC EXERCISES: CPT | Mod: GP

## 2023-03-31 PROCEDURE — 120N000001 HC R&B MED SURG/OB

## 2023-03-31 PROCEDURE — 250N000013 HC RX MED GY IP 250 OP 250 PS 637: Performed by: STUDENT IN AN ORGANIZED HEALTH CARE EDUCATION/TRAINING PROGRAM

## 2023-03-31 PROCEDURE — 85018 HEMOGLOBIN: CPT | Performed by: STUDENT IN AN ORGANIZED HEALTH CARE EDUCATION/TRAINING PROGRAM

## 2023-03-31 PROCEDURE — 97535 SELF CARE MNGMENT TRAINING: CPT | Mod: GO

## 2023-03-31 PROCEDURE — 36415 COLL VENOUS BLD VENIPUNCTURE: CPT | Performed by: STUDENT IN AN ORGANIZED HEALTH CARE EDUCATION/TRAINING PROGRAM

## 2023-03-31 PROCEDURE — 97166 OT EVAL MOD COMPLEX 45 MIN: CPT | Mod: GO

## 2023-03-31 PROCEDURE — 97116 GAIT TRAINING THERAPY: CPT | Mod: GP

## 2023-03-31 PROCEDURE — 99232 SBSQ HOSP IP/OBS MODERATE 35: CPT | Performed by: STUDENT IN AN ORGANIZED HEALTH CARE EDUCATION/TRAINING PROGRAM

## 2023-03-31 PROCEDURE — 250N000011 HC RX IP 250 OP 636: Performed by: STUDENT IN AN ORGANIZED HEALTH CARE EDUCATION/TRAINING PROGRAM

## 2023-03-31 RX ORDER — OXYCODONE HYDROCHLORIDE 5 MG/1
5 TABLET ORAL EVERY 4 HOURS PRN
Qty: 30 TABLET | Refills: 0 | Status: ON HOLD | OUTPATIENT
Start: 2023-03-31 | End: 2024-03-13

## 2023-03-31 RX ORDER — AMOXICILLIN 250 MG
1 CAPSULE ORAL 2 TIMES DAILY
Status: ON HOLD
Start: 2023-03-31 | End: 2024-03-13

## 2023-03-31 RX ORDER — DEXTROSE MONOHYDRATE 25 G/50ML
25-50 INJECTION, SOLUTION INTRAVENOUS
Status: DISCONTINUED | OUTPATIENT
Start: 2023-03-31 | End: 2023-03-31

## 2023-03-31 RX ORDER — HYDROXYZINE HYDROCHLORIDE 10 MG/1
10 TABLET, FILM COATED ORAL EVERY 6 HOURS PRN
Qty: 20 TABLET | Refills: 0 | Status: SHIPPED | OUTPATIENT
Start: 2023-03-31

## 2023-03-31 RX ORDER — METHOCARBAMOL 500 MG/1
500 TABLET, FILM COATED ORAL EVERY 6 HOURS PRN
Qty: 20 TABLET | Refills: 0 | Status: ON HOLD | OUTPATIENT
Start: 2023-03-31 | End: 2024-03-13

## 2023-03-31 RX ORDER — NICOTINE POLACRILEX 4 MG
15-30 LOZENGE BUCCAL
Status: DISCONTINUED | OUTPATIENT
Start: 2023-03-31 | End: 2023-03-31

## 2023-03-31 RX ADMIN — METHOCARBAMOL 500 MG: 500 TABLET ORAL at 11:36

## 2023-03-31 RX ADMIN — LEVOTHYROXINE SODIUM 175 MCG: 0.05 TABLET ORAL at 08:30

## 2023-03-31 RX ADMIN — POLYETHYLENE GLYCOL 3350 17 G: 17 POWDER, FOR SOLUTION ORAL at 08:30

## 2023-03-31 RX ADMIN — OXYCODONE HYDROCHLORIDE 5 MG: 5 TABLET ORAL at 08:27

## 2023-03-31 RX ADMIN — ACETAMINOPHEN 975 MG: 325 TABLET ORAL at 15:13

## 2023-03-31 RX ADMIN — ATORVASTATIN CALCIUM 20 MG: 10 TABLET, FILM COATED ORAL at 08:29

## 2023-03-31 RX ADMIN — IRBESARTAN 150 MG: 150 TABLET ORAL at 08:37

## 2023-03-31 RX ADMIN — SENNOSIDES AND DOCUSATE SODIUM 1 TABLET: 50; 8.6 TABLET ORAL at 08:28

## 2023-03-31 RX ADMIN — THERA TABS 1 TABLET: TAB at 08:30

## 2023-03-31 RX ADMIN — ACETAMINOPHEN 975 MG: 325 TABLET ORAL at 06:50

## 2023-03-31 RX ADMIN — CEFAZOLIN SODIUM 2 G: 2 INJECTION, SOLUTION INTRAVENOUS at 03:22

## 2023-03-31 RX ADMIN — SENNOSIDES AND DOCUSATE SODIUM 1 TABLET: 50; 8.6 TABLET ORAL at 21:09

## 2023-03-31 ASSESSMENT — ACTIVITIES OF DAILY LIVING (ADL)
ADLS_ACUITY_SCORE: 22

## 2023-03-31 NOTE — PLAN OF CARE
Occupational Therapy Discharge Summary    Reason for therapy discharge:    All goals and outcomes met, no further needs identified.    Progress towards therapy goal(s). See goals on Care Plan in [facility] electronic health record for goal details.  Goals met    Therapy recommendation(s):    No further therapy is recommended.

## 2023-03-31 NOTE — PROGRESS NOTES
Care Management Follow Up    Length of Stay (days): 1    Expected Discharge Date: 04/01/2023     Concerns to be Addressed:     Discharge needs.    Additional Information:  CM reviewed chart, patient lives alone at baseline. Son will be staying with him post op and assisting as needed. Therapy recommending Home with assist. Anticipate he will return home with assistance from family. Family to provide a ride at discharge.      Jennifer Lynn RN

## 2023-03-31 NOTE — PROGRESS NOTES
"  Spine Surgery Progress Note    POD #: 1  S/P: Procedure(s):  RIGHT LUMBAR 4-5 TRANSFORAMINAL LUMBAR INTERBODY FUSION WITH LAMINECTOMY USING STEALTH NAVIGATION    Assessment:  S/P Procedure(s):  RIGHT LUMBAR 4-5 TRANSFORAMINAL LUMBAR INTERBODY FUSION WITH LAMINECTOMY USING STEALTH NAVIGATION on 3/30/2023            Plan:  - Continue PT/OT, appreciate recs  -Advance diet as tolerated  -Ambulate as tolerated; no bending, lifting, or twisting over next 6 weeks.  - Pain Management continue current regimen  - Anticoagulation: SCDs, travis stockings and early ambulation.  - Labs: hgb 11.0, transfuse if <7.0. No indication today  - Remove HV drain once output is <30 cc for 2 consecutive nursing shifts (8-hr shifts) -- keep drain in for at least one more shift as tubing became detached overnight and readings may be inaccurate.  - Follow-up: Outpatient follow up in 2 weeks  - Disposition: Patient plans to d/c home pending medical clearance, progression with therapies and removal of drain. Patient's son will be helping them with post-op and incision cares.     Subjective:  Patient is seen this morning sitting bedside. Appears comfortable. Incision pain that is well controlled.  Lower extremity pain noticeably improved compared with prior to surgery  No new extremity pain, numbness tingling or weakness to report  Patient reports that drain tubing became detached during the night.  (-) Nausea  (-) Vomiting  (+) Flatus  (-) BM    Hemovac drain in place      Objective:  /59 (BP Location: Right arm, Patient Position: Sitting, Cuff Size: Adult Regular)   Pulse 87   Temp 98.2  F (36.8  C) (Oral)   Resp 18   Ht 1.88 m (6' 2\")   Wt 81.8 kg (180 lb 6.4 oz)   SpO2 95%   BMI 23.16 kg/m        Incision covered with dressing. Gauze dressing in place at . Appears clean, dry, intact. Without any sign of infection.  Awake, alert and oriented. Patient is sitting bedside. Conversational  Non-labored breathing    RLE   5/5 hip " flexors  5/5 quadriceps  5/5 tibialis anterior  5/5 EHL  5/5 Gastroc Soleus  RLE L2-S1 intact to light touch    LLE   5/5 hip flexors  5/5 quadriceps  5/5 tibialis anterior  5/5 EHL  5/5 Gastroc Soleus  LLE L2-S1 intact to light touch    bilateral calves non-tender, non-edematous, non-erythematous, no warmth    Drain in place, without signs of infection: output 25 --> 10cc (drain tubing was detached throughout the night, readings may be inaccurate)      Labs:  Recent Labs   Lab Test 03/31/23  0523 06/15/21  0524 06/04/21  0507   WBC  --  7.5 7.7   RBC  --  4.17* 3.75*   HGB 11.0* 12.6* 11.3*   HCT  --  38.1* 34.2*   MCV  --  91 91   MCH  --  30.2 30.1   MCHC  --  33.1 33.0   PLT  --  441* 337     Recent Labs   Lab Test 06/04/21  0507   POTASSIUM 4.1   CHLORIDE 103   BUN 16         Krissy Oh PA-C, [facility]  Date: 3/31/2023  Time: 8:09 AM  [facility]

## 2023-03-31 NOTE — PROGRESS NOTES
03/31/23 0835   Appointment Info   Signing Clinician's Name / Credentials (OT) ALEJO Goddard   Living Environment   People in Home alone   Current Living Arrangements mobile home   Transportation Anticipated family or friend will provide   Living Environment Comments Pt FWW, walkin shower w/ shower chair, RTS   Self-Care   Usual Activity Tolerance good   Current Activity Tolerance good   Equipment Currently Used at Home none   Fall history within last six months no   Activity/Exercise/Self-Care Comment Pt IND w/ ADLs and IADLs - works 3 jobs   General Information   Onset of Illness/Injury or Date of Surgery 03/30/23   Referring Physician Buzz Jara MD   Patient/Family Therapy Goal Statement (OT) go home today or tomorrow   Additional Occupational Profile Info/Pertinent History of Current Problem s/p L4-L5 fusion   Existing Precautions/Restrictions spinal   Cognitive Status Examination   Orientation Status orientation to person, place and time   Visual Perception   Visual Impairment/Limitations corrective lenses full-time   Sensory   Sensory Comments numbness in feet from neuropathy at baseline   Pain Assessment   Patient Currently in Pain Yes, see Vital Sign flowsheet   Posture   Posture not impaired   Range of Motion Comprehensive   General Range of Motion no range of motion deficits identified   Strength Comprehensive (MMT)   General Manual Muscle Testing (MMT) Assessment no strength deficits identified   Muscle Tone Assessment   Muscle Tone Quick Adds No deficits were identified   Coordination   Upper Extremity Coordination No deficits were identified   Bed Mobility   Bed Mobility supine-sit;sit-supine   Comment (Bed Mobility) SBA-CGA for bed mobility   Transfers   Transfers bed-chair transfer;toilet transfer;shower transfer   Transfer Comments SBA-CGa for transfers   Activities of Daily Living   BADL Assessment/Intervention bathing;lower body dressing;toileting;grooming   Bathing  Assessment/Intervention   [place] Level (Bathing) minimum assist (75% patient effort)   Lower Body Dressing Assessment/Training   [place] Level (Lower Body Dressing) minimum assist (75% patient effort)   Grooming Assessment/Training   [place] Level (Grooming) contact guard assist   Toileting   [place] Level (Toileting) supervision   Clinical Impression   Criteria for Skilled Therapeutic Interventions Met (OT) Yes, treatment indicated   OT Diagnosis decreased ADLs   Influenced by the following impairments lumbar fusion   OT Problem List-Impairments impacting ADL activity tolerance impaired;pain;post-surgical precautions   Assessment of Occupational Performance 3-5 Performance Deficits   Identified Performance Deficits LE dressing, bed mobility, all transfers, bathing, g/h, toileting   Planned Therapy Interventions (OT) ADL retraining;bed mobility training;transfer training   Clinical Decision Making Complexity (OT) moderate complexity   Risk & Benefits of therapy have been explained evaluation/treatment results reviewed;patient   OT Total Evaluation Time   OT Eval, Moderate Complexity Minutes (96909) 10   OT Goals   Therapy Frequency (OT) One time eval and treatment   OT Predicted Duration/Target Date for Goal Attainment 03/31/23   OT Goals Lower Body Dressing;Bed Mobility;Transfers;Toilet Transfer/Toileting   OT: Lower Body Dressing Modified independent;within precautions;Goal Met;Completed   OT: Bed Mobility Modified independent;supine to/from sitting;rolling;bridging;within precautions;Goal Met;Completed   OT: Transfer Modified independent;with assistive device;within precautions;Goal Met;Completed  (walkin shower)   OT: Toilet Transfer/Toileting Modified independent;toilet transfer;cleaning and garment management;within precautions;Goal Met;Completed   Interventions   Interventions Quick Adds Self-Care/Home Management   Self-Care/Home Management   Self-Care/Home Mgmt/ADL, Compensatory, Meal  Prep Minutes (75348) 25   Symptoms Noted During/After Treatment (Meal Preparation/Planning Training) none   Treatment Detail/Skilled Intervention Pt edu on spinal px - pt verbalized understanding. Pt edu on log roll technique for bed mobility - completed Mod I. Pt instructed on compensatory strategies for LE dressing - completed Mod I while seated EOB using figure 4 position. Recommend pt get reacher for home to maintain spinal px. Pt STS Mod I w/ FWW and amb. 20 ft to BR w/ FWW Mod I. Pt edu on safety technique for walkin shower transfer - pt completed SBA - recommended pt use extended scrubber at home. Pt edu on safe RTS transfer - completed  Mod I. Edu on compensatory strategies for g/h tasks while standing at sink. Edu handout given on ADLs w/in spinal px. Edu on car transfers - pt verbalized understanding. Pt ended session in recliner.   OT Discharge Planning   OT Plan DC OT   OT Discharge Recommendation (DC Rec) (S)  home with assist   OT Rationale for DC Rec Pt tolerating therapy well. Pt will have son staying w/ him for 1 week and his daughter can stay w/ him for additional time if needed. Pt has good setup at home. Recommend pt get reacher and extended scrubber for home.   OT Brief overview of current status Mod I for ADLs   Total Session Time   Timed Code Treatment Minutes 25   Total Session Time (sum of timed and untimed services) 35

## 2023-03-31 NOTE — PLAN OF CARE

## 2023-03-31 NOTE — PROGRESS NOTES
[facility]    Medicine Progress Note - Hospitalist Service    Date of Admission:  3/30/2023    Assessment & Plan   Kraig Chan is a 72 year old male admitted on 3/30/2023. He has a past medical history of diabetes mellitus type 1, hypothyroidism, retention, hyperlipidemia, who is status post right lumbar transforaminal interbody fusion with laminectomy using Stealth navigation by Dr. Jara on 3/30/2023 with EBL of 100 cc. Internal Medicine consulted for diabetes and thyroid disease.  Patient is seen on the floors.  Preop labs 3/22/2023 include hemoglobin A1c 6.7, normal BMP including creatinine 1.09, hemoglobin 12.8, platelets 208.     On consultation, patient is hemodynamically vitally stable, though mildly tachycardic at 102; on evaluation heart rate 99.  No new symptoms.  He paused his insulin pump before surgery; he prefers our sliding scale and basal bolus regimen, which is ordered.  Additionally he is requiring 2.5 L, so obtaining chest x-ray but also recommend outpatient sleep study as he has a history of snoring and nighttime relative hypoxia per daughter at bedside.    CXR without acute culprit findings. On POD1, remains stable on room air. Some elevated sugars though in setting of opting for conservative basal+bolus insulin (instead of pump) upon pt/daughter preference - increasing bolus and corrective scale 3/31/2023. Cleared from IM standpoint, suspect pending drainage improvement; as usual the final disposition per surgery and therapies. We do recommend sleep study as outpatient-- discussed with pt + his daughter.    - - - - -    Status post back surgery  Assessment: As above.  Plan:  - -DVT prophylaxis and pain management per primary service  -We will monitor for complications including respiratory concerns, urinary retention, ileus, skin reactions, infections, medication side effects, etc.  -Incentive spirometry   -discharge disposition per primary  service    Hyperlipidemia  A/P-on atorvastatin, continue    -Hypertension  Assessment: Normotensive pressures postoperatively.  Patient is on irbesartan once daily.    Plan:  -Continue    Hypothyroidism  Assessment: On levothyroxine 175 mcg daily  Plan:  -Continue    Diabetes mellitus type 1  Assessment: On regimen of insulin pump.  He uses about 55 units daily.  As such, we will start a basal regimen of 25 units Lantus, and 3 times daily with meals 8 units along with a corrective LDSSI scale.  Plan:  - Increase basal to 28u   -  INCREASE bolus mealtime doses to 10units  - INCREASE to High Corrective scale insulin  -Okay to continue home pump on discharge  -Hypoglycemia protocol       Diet: Consistent Carbohydrate Diet Moderate Consistent Carb (60 g CHO per Meal) Diet  Discharge Instruction - Regular Diet Adult    DVT Prophylaxis: Defer to primary service  Donahue Catheter: Not present  Lines: None     Cardiac Monitoring: None  Code Status: Full Code      Clinically Significant Risk Factors                                 Disposition Plan      Expected Discharge Date: 04/01/2023      Destination: home with family            Timothy Mills MD  Hospitalist Service  [facility]  Securely message with [employer] (more info)  Text page via [employer] Paging/Directory   ______________________________________________________________________    Interval History   -no acute interval events  -seen in early morning, no new symptoms or shortness of breath.  -We discussed disposition per primary team  -passing gas   -All questions answered    Physical Exam   Vital Signs: Temp: 98.2  F (36.8  C) Temp src: Oral BP: 118/59 Pulse: 87   Resp: 18 SpO2: 95 % O2 Device: None (Room air) Oxygen Delivery: 2 LPM  Weight: 180 lbs 6.4 oz    General: alert, oriented, and in no acute distress; drain stable  Pulmonary: clear to auscultation bilaterally, normal respiratory effort, on room air, no rales or wheezes or evidence of  accessory muscle use  CVS: regular rate and rhythm, no murmurs, rubs, or gallops; no blatant jugular venous distention; no extremity edema and extremities are warm to the touch  GI: soft, nontender, BS+, no rebound or guarding, no conspicuous organomegaly   Neuro: nonfocal, moves all extremities of own volition  Psych: appropriate         Medical Decision Making       40 MINUTES SPENT BY ME on the date of service doing chart review, history, exam, documentation & further activities per the note.      Data     I have personally reviewed the following data over the past 24 hrs:    N/A  \   11.0 (L)   / N/A     N/A N/A N/A /  307 (H)   N/A N/A N/A \       TSH: N/A T4: N/A A1C: 6.4 (H)       Imaging results reviewed over the past 24 hrs:   Recent Results (from the past 24 hour(s))   XR Chest Port 1 View    Narrative    EXAM: XR CHEST PORT 1 VIEW  LOCATION: [facility]  DATE/TIME: 3/30/2023 3:30 PM    INDICATION: hypoxia, please assess for any infiltrates, thank you  COMPARISON: 8/24/2021      Impression    IMPRESSION: The heart is enlarged.  Left basilar atelectasis.  Small left pleural effusion.  No pneumothorax.

## 2023-03-31 NOTE — PLAN OF CARE
"  Problem: Spinal Surgery  Goal: Optimal Coping with Surgery  Outcome: Progressing   Goal Outcome Evaluation:                      /69 (BP Location: Right arm)   Pulse 89   Temp 98.1  F (36.7  C) (Oral)   Resp 18   Ht 1.88 m (6' 2\")   Wt 81.8 kg (180 lb 6.4 oz)   SpO2 95%   BMI 23.16 kg/m      Patient vital signs are at baseline: Yes  Patient able to ambulate as they were prior to admission or with assist devices provided by therapies during their stay:  Yes  Patient MUST void prior to discharge:  Yes  Patient able to tolerate oral intake:  Yes  Pain has adequate pain control using Oral analgesics:  Yes  Does patient have an identified :  Yes  Has goal D/C date and time been discussed with patient:  Yes    Ricky Parkinson RN      "

## 2023-04-01 VITALS
OXYGEN SATURATION: 94 % | RESPIRATION RATE: 16 BRPM | HEIGHT: 74 IN | TEMPERATURE: 98.5 F | BODY MASS INDEX: 23.15 KG/M2 | SYSTOLIC BLOOD PRESSURE: 123 MMHG | WEIGHT: 180.4 LBS | DIASTOLIC BLOOD PRESSURE: 69 MMHG | HEART RATE: 99 BPM

## 2023-04-01 LAB
GLUCOSE BLDC GLUCOMTR-MCNC: 135 MG/DL (ref 70–99)
HGB BLD-MCNC: 11.5 G/DL (ref 13.3–17.7)

## 2023-04-01 PROCEDURE — 85018 HEMOGLOBIN: CPT | Performed by: STUDENT IN AN ORGANIZED HEALTH CARE EDUCATION/TRAINING PROGRAM

## 2023-04-01 PROCEDURE — 250N000013 HC RX MED GY IP 250 OP 250 PS 637: Performed by: STUDENT IN AN ORGANIZED HEALTH CARE EDUCATION/TRAINING PROGRAM

## 2023-04-01 PROCEDURE — 36415 COLL VENOUS BLD VENIPUNCTURE: CPT | Performed by: STUDENT IN AN ORGANIZED HEALTH CARE EDUCATION/TRAINING PROGRAM

## 2023-04-01 PROCEDURE — 99232 SBSQ HOSP IP/OBS MODERATE 35: CPT | Performed by: STUDENT IN AN ORGANIZED HEALTH CARE EDUCATION/TRAINING PROGRAM

## 2023-04-01 RX ADMIN — POLYETHYLENE GLYCOL 3350 17 G: 17 POWDER, FOR SOLUTION ORAL at 08:11

## 2023-04-01 RX ADMIN — ACETAMINOPHEN 975 MG: 325 TABLET ORAL at 08:09

## 2023-04-01 RX ADMIN — IRBESARTAN 150 MG: 150 TABLET ORAL at 08:12

## 2023-04-01 RX ADMIN — SENNOSIDES AND DOCUSATE SODIUM 1 TABLET: 50; 8.6 TABLET ORAL at 08:11

## 2023-04-01 RX ADMIN — LEVOTHYROXINE SODIUM 175 MCG: 0.05 TABLET ORAL at 08:10

## 2023-04-01 RX ADMIN — ATORVASTATIN CALCIUM 20 MG: 10 TABLET, FILM COATED ORAL at 08:09

## 2023-04-01 RX ADMIN — THERA TABS 1 TABLET: TAB at 08:11

## 2023-04-01 RX ADMIN — ACETAMINOPHEN 975 MG: 325 TABLET ORAL at 00:25

## 2023-04-01 ASSESSMENT — ACTIVITIES OF DAILY LIVING (ADL)
ADLS_ACUITY_SCORE: 22

## 2023-04-01 NOTE — PLAN OF CARE
Goal Outcome Evaluation:      Problem: Spinal Surgery  Goal: Optimal Coping with Surgery  Outcome: Progressing     Problem: Plan of Care - These are the overarching goals to be used throughout the patient stay.    Goal: Plan of Care Review  Description: The Plan of Care Review/Shift note should be completed every shift.  The Outcome Evaluation is a brief statement about your assessment that the patient is improving, declining, or no change.  This information will be displayed automatically on your shift note.  Outcome: Progressing     Patient reports pain 3/10 which is controlled with scheduled Tylenol.  Patient is ambulating, voiding and tolerating a diabetic diet.  VSS.  A & O .  No output from hemovac. Pt will likely discharge home today.            Patient vital signs are at baseline: Yes  Patient able to ambulate as they were prior to admission or with assist devices provided by therapies during their stay:  Yes  Patient MUST void prior to discharge:  Yes  Patient able to tolerate oral intake:  Yes  Pain has adequate pain control using Oral analgesics:  Yes  Does patient have an identified :  Yes  Has goal D/C date and time been discussed with patient:  Yes

## 2023-04-01 NOTE — PLAN OF CARE
Goal Outcome Evaluation:      Plan of Care Reviewed With: patient    Overall Patient Progress: improving  Patient vital signs are at baseline: Yes  Patient able to ambulate as they were prior to admission or with assist devices provided by therapies during their stay:  Yes  Patient MUST void prior to discharge:  Yes  Patient able to tolerate oral intake:  Yes  Pain has adequate pain control using Oral analgesics:  Yes  Does patient have an identified :  Yes  Has goal D/C date and time been discussed with patient:  Yes

## 2023-04-01 NOTE — PROGRESS NOTES
"Ortho Progress Note      Post-operative Day: 2 Days Post-Op  S/P Procedure(s):  RIGHT LUMBAR 4-5 TRANSFORAMINAL LUMBAR INTERBODY FUSION WITH LAMINECTOMY USING STEALTH NAVIGATION      Subjective:  Pain: mild  Chest pain, SOB:  no  Nausea, vomiting:  no  Lightheadedness, dizziness:  no  Neuro:  Patient denies new onset numbness or paresthesias    Patient up in bed this morning.  Doing well, pain controlled.  Had worked yesterday with therapies without issues.  No headaches.  No other complaints this morning. Patient planning to go home with daughter.     Objective:  /69 (BP Location: Right arm)   Pulse 99   Temp 98.5  F (36.9  C) (Oral)   Resp 16   Ht 1.88 m (6' 2\")   Wt 81.8 kg (180 lb 6.4 oz)   SpO2 94%   BMI 23.16 kg/m    Gen: A&O x 3. NAD. Appears comfortable.   Lumbar spine examined.  Incision covered with dressing.  C/d/i.   Intact motor function in plantar/dorsiflexion of ankles and EHL bilaterally  Sensation grossly intact in L2-S1 dermatomes bilateral lower extremities  Feet warm and well perfused  Brisk cap refill    Drain:  10 cc    Pertinent Labs   Lab Results: personally reviewed.   No results found for: INR, PROTIME  Lab Results   Component Value Date    WBC 7.5 06/15/2021    HGB 11.5 (L) 04/01/2023    HCT 38.1 (L) 06/15/2021    MCV 91 06/15/2021     (H) 06/15/2021     Lab Results   Component Value Date     06/04/2021    CO2 29 06/04/2021       Assessment: 2 days s/p lumbar fusion by Dr. Jara    Plan:   Continue PT/OT  Corset brace as needed when up  Remove drain  WBAT  No lifting, bending or twisting x 6 weeks  Anticoagulation: SCDs, travis stockings and early ambulation.  Discharge planning: home today      Report completed by:  Shawna Dong PA-C  Date: 4/1/2023  Time: 9:20 AM    "

## 2023-04-01 NOTE — PLAN OF CARE
Patient vital signs are at baseline: Yes  Patient able to ambulate as they were prior to admission or with assist devices provided by therapies during their stay:  Yes  Patient MUST void prior to discharge:  Yes  Patient able to tolerate oral intake:  Yes  Pain has adequate pain control using Oral analgesics:  Yes  Does patient have an identified :  Yes  Has goal D/C date and time been discussed with patient:  Yes Goal Outcome Evaluation:      Plan of Care Reviewed With: patient    Overall Patient Progress: improvingOverall Patient Progress: improving  pt remains stable. Nil output in the Hemovac. Ambulating with one assist, for potential discharge tomorrow.

## 2023-04-01 NOTE — PROGRESS NOTES
[facility]    Medicine Progress Note - Hospitalist Service    Date of Admission:  3/30/2023    Assessment & Plan   Kraig Chan is a 72 year old male admitted on 3/30/2023. He has a past medical history of diabetes mellitus type 1, hypothyroidism, retention, hyperlipidemia, who is status post right lumbar transforaminal interbody fusion with laminectomy using Stealth navigation by Dr. Jara on 3/30/2023 with EBL of 100 cc. Internal Medicine consulted for diabetes and thyroid disease.  Patient is seen on the floors.  Preop labs 3/22/2023 include hemoglobin A1c 6.7, normal BMP including creatinine 1.09, hemoglobin 12.8, platelets 208.     On consultation, patient is hemodynamically vitally stable, though mildly tachycardic at 102; on evaluation heart rate 99.  No new symptoms.  He paused his insulin pump before surgery; he prefers our sliding scale and basal bolus regimen, which is ordered.  Additionally he is requiring 2.5 L, so obtaining chest x-ray but also recommend outpatient sleep study as he has a history of snoring and nighttime relative hypoxia per daughter at bedside. CXR without acute culprit findings. On POD1, remains stable on room air. Labile sugars though in setting of opting for conservative basal+bolus insulin (instead of home pump) upon pt/daughter preference - still titrating/increasing bolus/basal regimen.     Stable, remains cleared from IM standpoint, pending drainage improvement; as usual the final disposition per surgery and therapies. We do recommend sleep study as outpatient-- discussed with pt + his daughter.   - - - - -    Status post back surgery  Assessment: As above.  Plan:  - -DVT prophylaxis and pain management per primary service  -We will monitor for complications including respiratory concerns, urinary retention, ileus, skin reactions, infections, medication side effects, etc.   -Incentive spirometry   -discharge disposition per primary  service    Hyperlipidemia  A/P-on atorvastatin, continue    -Hypertension  Assessment: Normotensive pressures postoperatively.  Patient is on irbesartan once daily.    Plan:  -Continue    Hypothyroidism  Assessment: On levothyroxine 175 mcg daily  Plan:  -Continue    Diabetes mellitus type 1  Assessment: On regimen of insulin pump.  He uses about 55 units daily.  As such, we started a basal regimen of 25 units Lantus, and 3 times daily with meals 8 units along with a corrective LDSSI scale.  Plan:  - Increase basal to 30u  -  INCREASE bolus mealtime doses to 12units  - INCREASE to High Corrective scale insulin  -Okay to continue home pump on discharge  -Hypoglycemia protocol       Diet: Consistent Carbohydrate Diet Moderate Consistent Carb (60 g CHO per Meal) Diet  Discharge Instruction - Regular Diet Adult    DVT Prophylaxis: Defer to primary service  Donahue Catheter: Not present  Lines: None     Cardiac Monitoring: None  Code Status: Full Code      Clinically Significant Risk Factors                                 Disposition Plan     Expected Discharge Date: 04/01/2023      Destination: home with family            Timothy Mills MD  Hospitalist Service  [facility]  Securely message with Ideapod (more info)  Text page via [employer] Paging/Directory   ______________________________________________________________________    Interval History   -no acute interval events  -seen in early morning, no new symptoms or shortness of breath.  -We discussed disposition per primary team; his daughter will pick him   -passing gas still  -All questions answered    Physical Exam   Vital Signs: Temp: 98.1  F (36.7  C) Temp src: Oral BP: 132/72 Pulse: 98   Resp: 18 SpO2: 92 % O2 Device: None (Room air)    Weight: 180 lbs 6.4 oz     General: alert, oriented, and in no acute distress; drain stable  Pulmonary: clear to auscultation bilaterally, normal respiratory effort, on room air, no rales or wheezes or  evidence of accessory muscle use  CVS: regular rate and rhythm, no murmurs, rubs, or gallops; no blatant jugular venous distention; no extremity edema and extremities are warm to the touch  GI: soft, nontender, BS+, no rebound or guarding, no conspicuous organomegaly   Neuro: nonfocal, moves all extremities of own volition  Psych: appropriate       Medical Decision Making       35 MINUTES SPENT BY ME on the date of service doing chart review, history, exam, documentation & further activities per the note.      Data     I have personally reviewed the following data over the past 24 hrs:    N/A  \   11.5 (L)   / N/A     N/A N/A N/A /  272 (H)   N/A N/A N/A \       Imaging results reviewed over the past 24 hrs:   No results found for this or any previous visit (from the past 24 hour(s)).

## 2023-04-04 NOTE — DISCHARGE SUMMARY
ORTHOPEDIC HOSPITAL DISCHARGE SUMMARY    Patient Name: Kraig Chan  YOB: 1950 Age: 72 year old  Medical Record Number: [id number]  Primary Physician: Colton Contreras  Admission Date: 3/30/2023  Discharge Date: 4/1/2023  1:10 PM      PRINCIPAL DIAGNOSIS CAUSING ADMISSION: Lumbar spinal stenosis [M48.061]  Spondylolisthesis [M43.10]  S/P spinal surgery [Z98.890]    Patient Active Hospital Problem List:    DISCHARGE MEDICATIONS  ASA for DVT prophylaxis    BRIEF HOSPITAL COURSE: This 72 year old male underwent RIGHT LUMBAR 4-5 TRANSFORAMINAL LUMBAR INTERBODY FUSION WITH LAMINECTOMY USING STEALTH NAVIGATION     PROCEDURES PERFORMED: RIGHT LUMBAR 4-5 TRANSFORAMINAL LUMBAR INTERBODY FUSION WITH LAMINECTOMY USING STEALTH NAVIGATION on 3/30/2023    COMPLICATIONS IN HOSPITAL: None    Brief Summary:  Kraig Chan was admitted on 3/30/2023  for Procedure(s):  RIGHT LUMBAR 4-5 TRANSFORAMINAL LUMBAR INTERBODY FUSION WITH LAMINECTOMY USING STEALTH NAVIGATION    There were no intraoperative or perioperative complications. Post operatively Kraig Chan was progressed to general diet, physical therapy and pain protocols and was subsequently discharged on 4/1/2023  1:10 PM.    Further details as per the medical record.      Discharge instruction and meds were given per the after discharge order set and the patient was to return to clinic in 1-2 weeks for routine followup.        Buzz Jara MD, M.D.  [facility]

## 2024-03-13 ENCOUNTER — HOSPITAL ENCOUNTER (OUTPATIENT)
Facility: HOSPITAL | Age: 74
Discharge: HOME | End: 2024-03-13
Attending: SURGERY | Admitting: SURGERY
Payer: COMMERCIAL

## 2024-03-13 ENCOUNTER — ANESTHESIA EVENT (OUTPATIENT)
Dept: SURGERY | Facility: HOSPITAL | Age: 74
End: 2024-03-13
Payer: COMMERCIAL

## 2024-03-13 ENCOUNTER — ANESTHESIA (OUTPATIENT)
Dept: SURGERY | Facility: HOSPITAL | Age: 74
End: 2024-03-13
Payer: COMMERCIAL

## 2024-03-13 VITALS
RESPIRATION RATE: 20 BRPM | BODY MASS INDEX: 23.52 KG/M2 | WEIGHT: 183.2 LBS | HEART RATE: 57 BPM | SYSTOLIC BLOOD PRESSURE: 108 MMHG | OXYGEN SATURATION: 96 % | TEMPERATURE: 97.5 F | DIASTOLIC BLOOD PRESSURE: 59 MMHG

## 2024-03-13 DIAGNOSIS — R22.2 MASS OF TORSO: Primary | ICD-10-CM

## 2024-03-13 LAB — GLUCOSE BLDC GLUCOMTR-MCNC: 89 MG/DL (ref 70–99)

## 2024-03-13 PROCEDURE — 370N000017 HC ANESTHESIA TECHNICAL FEE, PER MIN: Performed by: SURGERY

## 2024-03-13 PROCEDURE — 250N000011 HC RX IP 250 OP 636: Performed by: SURGERY

## 2024-03-13 PROCEDURE — 999N000141 HC STATISTIC PRE-PROCEDURE NURSING ASSESSMENT: Performed by: SURGERY

## 2024-03-13 PROCEDURE — 250N000009 HC RX 250: Performed by: NURSE ANESTHETIST, CERTIFIED REGISTERED

## 2024-03-13 PROCEDURE — 258N000003 HC RX IP 258 OP 636: Performed by: NURSE ANESTHETIST, CERTIFIED REGISTERED

## 2024-03-13 PROCEDURE — 258N000003 HC RX IP 258 OP 636: Performed by: ANESTHESIOLOGY

## 2024-03-13 PROCEDURE — 82962 GLUCOSE BLOOD TEST: CPT

## 2024-03-13 PROCEDURE — 250N000009 HC RX 250: Performed by: SURGERY

## 2024-03-13 PROCEDURE — 250N000013 HC RX MED GY IP 250 OP 250 PS 637: Performed by: ANESTHESIOLOGY

## 2024-03-13 PROCEDURE — 88304 TISSUE EXAM BY PATHOLOGIST: CPT | Mod: TC | Performed by: SURGERY

## 2024-03-13 PROCEDURE — 272N000001 HC OR GENERAL SUPPLY STERILE: Performed by: SURGERY

## 2024-03-13 PROCEDURE — 710N000012 HC RECOVERY PHASE 2, PER MINUTE: Performed by: SURGERY

## 2024-03-13 PROCEDURE — 360N000075 HC SURGERY LEVEL 2, PER MIN: Performed by: SURGERY

## 2024-03-13 PROCEDURE — 250N000011 HC RX IP 250 OP 636: Performed by: NURSE ANESTHETIST, CERTIFIED REGISTERED

## 2024-03-13 RX ORDER — NALOXONE HYDROCHLORIDE 1 MG/ML
0.1 INJECTION INTRAMUSCULAR; INTRAVENOUS; SUBCUTANEOUS
Status: DISCONTINUED | OUTPATIENT
Start: 2024-03-13 | End: 2024-03-13 | Stop reason: HOSPADM

## 2024-03-13 RX ORDER — ONDANSETRON 4 MG/1
4 TABLET, ORALLY DISINTEGRATING ORAL EVERY 30 MIN PRN
Status: DISCONTINUED | OUTPATIENT
Start: 2024-03-13 | End: 2024-03-13 | Stop reason: HOSPADM

## 2024-03-13 RX ORDER — ONDANSETRON 2 MG/ML
4 INJECTION INTRAMUSCULAR; INTRAVENOUS EVERY 30 MIN PRN
Status: DISCONTINUED | OUTPATIENT
Start: 2024-03-13 | End: 2024-03-13 | Stop reason: HOSPADM

## 2024-03-13 RX ORDER — FENTANYL CITRATE 50 UG/ML
50 INJECTION, SOLUTION INTRAMUSCULAR; INTRAVENOUS EVERY 5 MIN PRN
Status: DISCONTINUED | OUTPATIENT
Start: 2024-03-13 | End: 2024-03-13 | Stop reason: HOSPADM

## 2024-03-13 RX ORDER — SODIUM CHLORIDE, SODIUM LACTATE, POTASSIUM CHLORIDE, CALCIUM CHLORIDE 600; 310; 30; 20 MG/100ML; MG/100ML; MG/100ML; MG/100ML
INJECTION, SOLUTION INTRAVENOUS CONTINUOUS
Status: DISCONTINUED | OUTPATIENT
Start: 2024-03-13 | End: 2024-03-13 | Stop reason: HOSPADM

## 2024-03-13 RX ORDER — CEFAZOLIN SODIUM/WATER 2 G/20 ML
2 SYRINGE (ML) INTRAVENOUS
Status: COMPLETED | OUTPATIENT
Start: 2024-03-13 | End: 2024-03-13

## 2024-03-13 RX ORDER — MAGNESIUM HYDROXIDE 1200 MG/15ML
LIQUID ORAL PRN
Status: DISCONTINUED | OUTPATIENT
Start: 2024-03-13 | End: 2024-03-13 | Stop reason: HOSPADM

## 2024-03-13 RX ORDER — CEFAZOLIN SODIUM/WATER 2 G/20 ML
2 SYRINGE (ML) INTRAVENOUS SEE ADMIN INSTRUCTIONS
Status: DISCONTINUED | OUTPATIENT
Start: 2024-03-13 | End: 2024-03-13 | Stop reason: HOSPADM

## 2024-03-13 RX ORDER — LIDOCAINE HYDROCHLORIDE 10 MG/ML
INJECTION, SOLUTION INFILTRATION; PERINEURAL PRN
Status: DISCONTINUED | OUTPATIENT
Start: 2024-03-13 | End: 2024-03-13

## 2024-03-13 RX ORDER — PROPOFOL 10 MG/ML
INJECTION, EMULSION INTRAVENOUS PRN
Status: DISCONTINUED | OUTPATIENT
Start: 2024-03-13 | End: 2024-03-13

## 2024-03-13 RX ORDER — FENTANYL CITRATE 50 UG/ML
25 INJECTION, SOLUTION INTRAMUSCULAR; INTRAVENOUS EVERY 5 MIN PRN
Status: DISCONTINUED | OUTPATIENT
Start: 2024-03-13 | End: 2024-03-13 | Stop reason: HOSPADM

## 2024-03-13 RX ORDER — FENTANYL CITRATE 50 UG/ML
25 INJECTION, SOLUTION INTRAMUSCULAR; INTRAVENOUS
Status: DISCONTINUED | OUTPATIENT
Start: 2024-03-13 | End: 2024-03-13 | Stop reason: HOSPADM

## 2024-03-13 RX ORDER — ONDANSETRON 2 MG/ML
INJECTION INTRAMUSCULAR; INTRAVENOUS PRN
Status: DISCONTINUED | OUTPATIENT
Start: 2024-03-13 | End: 2024-03-13

## 2024-03-13 RX ORDER — ACETAMINOPHEN 325 MG/1
975 TABLET ORAL ONCE
Status: COMPLETED | OUTPATIENT
Start: 2024-03-13 | End: 2024-03-13

## 2024-03-13 RX ORDER — FENTANYL CITRATE 50 UG/ML
INJECTION, SOLUTION INTRAMUSCULAR; INTRAVENOUS PRN
Status: DISCONTINUED | OUTPATIENT
Start: 2024-03-13 | End: 2024-03-13

## 2024-03-13 RX ORDER — HYDROCODONE BITARTRATE AND ACETAMINOPHEN 5; 325 MG/1; MG/1
1-2 TABLET ORAL EVERY 4 HOURS PRN
Qty: 15 TABLET | Refills: 0 | Status: SHIPPED | OUTPATIENT
Start: 2024-03-13

## 2024-03-13 RX ORDER — OXYCODONE HYDROCHLORIDE 5 MG/1
5 TABLET ORAL
Status: DISCONTINUED | OUTPATIENT
Start: 2024-03-13 | End: 2024-03-13 | Stop reason: HOSPADM

## 2024-03-13 RX ORDER — BUPIVACAINE HYDROCHLORIDE AND EPINEPHRINE 2.5; 5 MG/ML; UG/ML
INJECTION, SOLUTION INFILTRATION; PERINEURAL PRN
Status: DISCONTINUED | OUTPATIENT
Start: 2024-03-13 | End: 2024-03-13 | Stop reason: HOSPADM

## 2024-03-13 RX ORDER — PROPOFOL 10 MG/ML
INJECTION, EMULSION INTRAVENOUS CONTINUOUS PRN
Status: DISCONTINUED | OUTPATIENT
Start: 2024-03-13 | End: 2024-03-13

## 2024-03-13 RX ORDER — LIDOCAINE 40 MG/G
CREAM TOPICAL
Status: DISCONTINUED | OUTPATIENT
Start: 2024-03-13 | End: 2024-03-13 | Stop reason: HOSPADM

## 2024-03-13 RX ORDER — ACETAMINOPHEN 325 MG/1
975 TABLET ORAL ONCE
Status: DISCONTINUED | OUTPATIENT
Start: 2024-03-13 | End: 2024-03-13 | Stop reason: HOSPADM

## 2024-03-13 RX ORDER — OXYCODONE HYDROCHLORIDE 5 MG/1
10 TABLET ORAL
Status: DISCONTINUED | OUTPATIENT
Start: 2024-03-13 | End: 2024-03-13 | Stop reason: HOSPADM

## 2024-03-13 RX ADMIN — PHENYLEPHRINE HYDROCHLORIDE 100 MCG: 10 INJECTION INTRAVENOUS at 11:27

## 2024-03-13 RX ADMIN — FENTANYL CITRATE 50 MCG: 50 INJECTION INTRAMUSCULAR; INTRAVENOUS at 11:16

## 2024-03-13 RX ADMIN — ACETAMINOPHEN 975 MG: 325 TABLET ORAL at 08:56

## 2024-03-13 RX ADMIN — LIDOCAINE HYDROCHLORIDE 5 ML: 10 INJECTION, SOLUTION INFILTRATION; PERINEURAL at 11:16

## 2024-03-13 RX ADMIN — FENTANYL CITRATE 50 MCG: 50 INJECTION INTRAMUSCULAR; INTRAVENOUS at 11:38

## 2024-03-13 RX ADMIN — PROPOFOL 100 MCG/KG/MIN: 10 INJECTION, EMULSION INTRAVENOUS at 11:16

## 2024-03-13 RX ADMIN — SODIUM CHLORIDE, POTASSIUM CHLORIDE, SODIUM LACTATE AND CALCIUM CHLORIDE: 600; 310; 30; 20 INJECTION, SOLUTION INTRAVENOUS at 08:56

## 2024-03-13 RX ADMIN — PROPOFOL 50 MG: 10 INJECTION, EMULSION INTRAVENOUS at 11:16

## 2024-03-13 RX ADMIN — PROPOFOL 20 MG: 10 INJECTION, EMULSION INTRAVENOUS at 11:18

## 2024-03-13 RX ADMIN — ONDANSETRON 4 MG: 2 INJECTION INTRAMUSCULAR; INTRAVENOUS at 11:40

## 2024-03-13 RX ADMIN — Medication 2 G: at 11:07

## 2024-03-13 RX ADMIN — SODIUM CHLORIDE, POTASSIUM CHLORIDE, SODIUM LACTATE AND CALCIUM CHLORIDE: 600; 310; 30; 20 INJECTION, SOLUTION INTRAVENOUS at 11:12

## 2024-03-13 RX ADMIN — DEXMEDETOMIDINE HYDROCHLORIDE 20 MCG: 100 INJECTION, SOLUTION INTRAVENOUS at 11:07

## 2024-03-13 ASSESSMENT — ACTIVITIES OF DAILY LIVING (ADL)
ADLS_ACUITY_SCORE: 23
ADLS_ACUITY_SCORE: 32

## 2024-03-13 NOTE — ANESTHESIA PREPROCEDURE EVALUATION
Anesthesia Pre-Procedure Evaluation    Patient: Kraig Chan   MRN: [id number] : 1950        Procedure : Procedure(s):  EXCISION LEFT CHEST WALL MASS          Past Medical History:   Diagnosis Date    Arthritis     Diabetes (H)     Heart enlarged     Other chronic pain     Thyroid disease       Past Surgical History:   Procedure Laterality Date    EYE SURGERY      FUSION TRANSFORAMINAL LUMBAR INTERBODY, 1 LEVEL, POSTERIOR APPROACH, USING OTS SURG IMAGING GUIDANCE Right 3/30/2023    Procedure: RIGHT LUMBAR 4-5 TRANSFORAMINAL LUMBAR INTERBODY FUSION WITH LAMINECTOMY USING STEALTH NAVIGATION;  Surgeon: Buzz Jara MD;  Location: [facility] Main OR    ORTHOPEDIC SURGERY        Allergies   Allergen Reactions    Sulfa Antibiotics Unknown      Social History     Tobacco Use    Smoking status: Never    Smokeless tobacco: Never   Substance Use Topics    Alcohol use: Yes     Comment: very seldom      Wt Readings from Last 1 Encounters:   24 83.1 kg (183 lb 3.2 oz)        Anesthesia Evaluation            ROS/MED HX  ENT/Pulmonary:  - neg pulmonary ROS     Neurologic:  - neg neurologic ROS     Cardiovascular:  - neg cardiovascular ROS     METS/Exercise Tolerance:     Hematologic:  - neg hematologic  ROS     Musculoskeletal:       GI/Hepatic:  - neg GI/hepatic ROS     Renal/Genitourinary:  - neg Renal ROS     Endo: Comment: CGM on right abdomen. Insulin pump on right LE set to manual.     (+)  type II DM,        thyroid problem,            Psychiatric/Substance Use:  - neg psychiatric ROS     Infectious Disease:  - neg infectious disease ROS     Malignancy:       Other:      (+)  , H/O Chronic Pain,         Physical Exam    Airway  airway exam normal      Mallampati: I   TM distance: > 3 FB   Neck ROM: full   Mouth opening: > 3 cm    Respiratory Devices and Support         Dental       (+) Minor Abnormalities - some fillings, tiny chips      Cardiovascular   cardiovascular exam normal       "    Pulmonary   pulmonary exam normal                OUTSIDE LABS:  CBC:   Lab Results   Component Value Date    WBC 7.5 06/15/2021    WBC 7.7 06/04/2021    HGB 11.5 (L) 04/01/2023    HGB 11.0 (L) 03/31/2023    HCT 38.1 (L) 06/15/2021    HCT 34.2 (L) 06/04/2021     (H) 06/15/2021     06/04/2021     BMP:   Lab Results   Component Value Date     06/04/2021    POTASSIUM 4.1 06/04/2021    CHLORIDE 103 06/04/2021    CO2 29 06/04/2021    BUN 16 06/04/2021    CR 1.16 06/04/2021    GLC 89 03/13/2024     (H) 04/01/2023     COAGS: No results found for: \"PTT\", \"INR\", \"FIBR\"  POC: No results found for: \"BGM\", \"HCG\", \"HCGS\"  HEPATIC: No results found for: \"ALBUMIN\", \"PROTTOTAL\", \"ALT\", \"AST\", \"GGT\", \"ALKPHOS\", \"BILITOTAL\", \"BILIDIRECT\", \"SHITAL\"  OTHER:   Lab Results   Component Value Date    A1C 6.4 (H) 03/30/2023    ELLIE 8.6 06/04/2021    TSH 9.93 (H) 06/04/2021       Anesthesia Plan    ASA Status:  2    NPO Status:  NPO Appropriate    Anesthesia Type: MAC.     - Reason for MAC: immobility needed, straight local not clinically adequate              Consents    Anesthesia Plan(s) and associated risks, benefits, and realistic alternatives discussed. Questions answered and patient/representative(s) expressed understanding.     - Discussed:     - Discussed with:  Patient      - Extended Intubation/Ventilatory Support Discussed: No.      - Patient is DNR/DNI Status: No          Postoperative Care    Pain management: Multi-modal analgesia.   PONV prophylaxis: Ondansetron (or other 5HT-3)     Comments:    Other Comments: MAC with propofol  Toradol if OK with surgeon   Fentanyl prn           Negar Francis MD    I have reviewed the pertinent notes and labs in the chart from the past 30 days and (re)examined the patient.  Any updates or changes from those notes are reflected in this note.             # Drug Induced Platelet Defect: home medication list includes an antiplatelet medication      "

## 2024-03-13 NOTE — ANESTHESIA POSTPROCEDURE EVALUATION
Patient: Kraig Chan    Procedure: Procedure(s):  EXCISION LEFT BACK MASS       Anesthesia Type:  MAC    Note:  Disposition: Outpatient   Postop Pain Control: Uneventful            Sign Out: Well controlled pain   PONV: No   Neuro/Psych: Uneventful            Sign Out: Acceptable/Baseline neuro status   Airway/Respiratory: Uneventful            Sign Out: Acceptable/Baseline resp. status   CV/Hemodynamics: Uneventful            Sign Out: Acceptable CV status; No obvious hypovolemia; No obvious fluid overload   Other NRE: NONE   DID A NON-ROUTINE EVENT OCCUR?            Last vitals:  Vitals Value Taken Time   /64 03/13/24 1230   Temp 36.8  C (98.3  F) 03/13/24 1153   Pulse 59 03/13/24 1239   Resp 20 03/13/24 1230   SpO2 96 % 03/13/24 1239   Vitals shown include unfiled device data.    Electronically Signed By: Negar Francis MD  March 13, 2024  12:41 PM

## 2024-03-13 NOTE — OP NOTE
OPERATIVE REPORT    Kraig Chan  [facility]  Medical Record #:  [id number]  YOB: 1950  Age:  73 year old    PROCEDURE DATE:  3/13/2024    PREOPERATIVE DIAGNOSIS: Left chest wall mass    POSTOPERATIVE DIAGNOSIS: Subfascial lipoma left chest wall tip of scapula location 7 cm    PROCEDURE: Removal of 7 cm subfascial lipoma left chest wall    OPERATING SURGEON:  Yehuda Prieto MD    ASSISTANT: Technician    ANESTHESIA: MAC    DESCRIPTION OF PROCEDURE: With the patient in the right lateral position under intravenous sedative anesthesia having reviewed the risk benefits and complications of surgical intervention within the left chest is prepped and draped in usual sterile fashion.  1/2% Marcaine with epinephrine used to infiltrate for field block a transverse incision is made as marked preoperatively.  Subcutaneous tissue sharply incised and the latissimus dorsi isolated and tip of scapula isolated as well.  With mobilization of the tip of the scapula soft tissue mass is palpable.  In addition rib deformity is noted at this location as well.  The muscle fascia is opened and the lipomatous mass is isolated and is removed with blunt dissection.  Time was taken to ensure hemostasis obtained the wound closed in layers with running interrupted 0 Vicryl and the skin closed with running 3-0 Vicryl subcuticular suture and Dermabond.  Estimated blood loss was minimal there were no complications and the patient tolerated the procedure well.  Sponge and counts correct x 2.    EBL:  [unfilled]    SPECIMENS:    ID Type Source Tests Collected by Time Destination   1 : Subfascial lipoma Tissue Back, Upper SURGICAL PATHOLOGY EXAM Yehuda Prieto MD 3/13/2024 11:29 AM        Yehuda prieto md  [facility]

## 2024-03-13 NOTE — ANESTHESIA CARE TRANSFER NOTE
Patient: Kraig Chan    Procedure: Procedure(s):  EXCISION LEFT BACK MASS       Diagnosis: Chest wall mass [R22.2]  Diagnosis Additional Information: No value filed.    Anesthesia Type:   MAC     Note:    Oropharynx: oropharynx clear of all foreign objects and spontaneously breathing  Level of Consciousness: awake  Oxygen Supplementation: room air    Independent Airway: airway patency satisfactory and stable  Dentition: dentition unchanged  Vital Signs Stable: post-procedure vital signs reviewed and stable  Report to RN Given: handoff report given  Patient transferred to: Phase II    Handoff Report: Identifed the Patient, Identified the Reponsible Provider, Reviewed the pertinent medical history, Discussed the surgical course, Reviewed Intra-OP anesthesia mangement and issues during anesthesia, Set expectations for post-procedure period and Allowed opportunity for questions and acknowledgement of understanding  Vitals:  Vitals Value Taken Time   BP 88/49 03/13/24 1153   Temp     Pulse 66 03/13/24 1155   Resp     SpO2 93 % 03/13/24 1155   Vitals shown include unfiled device data.    Electronically Signed By: ANUJ Real CRNA  March 13, 2024  11:56 AM

## 2024-03-16 LAB
PATH REPORT.COMMENTS IMP SPEC: NORMAL
PATH REPORT.COMMENTS IMP SPEC: NORMAL
PATH REPORT.FINAL DX SPEC: NORMAL
PATH REPORT.GROSS SPEC: NORMAL
PATH REPORT.MICROSCOPIC SPEC OTHER STN: NORMAL
PATH REPORT.RELEVANT HX SPEC: NORMAL
PHOTO IMAGE: NORMAL

## 2024-03-16 PROCEDURE — 88304 TISSUE EXAM BY PATHOLOGIST: CPT | Mod: 26 | Performed by: PATHOLOGY

## 2024-08-03 ENCOUNTER — OFFICE VISIT (OUTPATIENT)
Dept: URGENT CARE | Facility: CLINIC | Age: 74
End: 2024-08-03
Payer: COMMERCIAL

## 2024-08-03 VITALS
RESPIRATION RATE: 20 BRPM | DIASTOLIC BLOOD PRESSURE: 50 MMHG | TEMPERATURE: 97.7 F | HEART RATE: 93 BPM | SYSTOLIC BLOOD PRESSURE: 88 MMHG | OXYGEN SATURATION: 95 % | WEIGHT: 179 LBS

## 2024-08-03 DIAGNOSIS — R05.1 ACUTE COUGH: ICD-10-CM

## 2024-08-03 DIAGNOSIS — U07.1 COVID-19: Primary | ICD-10-CM

## 2024-08-03 LAB
FLUAV RNA NPH QL NAA+NON-PROBE: NEGATIVE
FLUBV RNA NPH QL NAA+NON-PROBE: NEGATIVE
SARS-COV-2 RNA RESP QL NAA+PROBE: POSITIVE

## 2024-08-03 PROCEDURE — 87636 SARSCOV2 & INF A&B AMP PRB: CPT

## 2024-08-03 PROCEDURE — 99203 OFFICE O/P NEW LOW 30 MIN: CPT | Mod: GF

## 2024-08-03 PROCEDURE — G0463 HOSPITAL OUTPT CLINIC VISIT: HCPCS

## 2024-08-03 RX ORDER — BLOOD-GLUCOSE SENSOR
EACH MISCELLANEOUS
COMMUNITY
Start: 2024-07-30

## 2024-08-03 RX ORDER — ATORVASTATIN CALCIUM 20 MG/1
20 TABLET, FILM COATED ORAL
COMMUNITY
Start: 2024-07-30

## 2024-08-03 RX ORDER — IRBESARTAN 150 MG/1
150 TABLET ORAL DAILY
COMMUNITY
Start: 2024-01-19

## 2024-08-03 RX ORDER — LEVOTHYROXINE SODIUM 175 UG/1
175 TABLET ORAL DAILY
COMMUNITY
Start: 2024-07-30

## 2024-08-03 RX ORDER — INSULIN LISPRO 100 [IU]/ML
INJECTION, SOLUTION INTRAVENOUS; SUBCUTANEOUS
COMMUNITY
Start: 2024-07-22

## 2024-08-03 ASSESSMENT — ENCOUNTER SYMPTOMS
HEADACHES: 0
COUGH: 1
SHORTNESS OF BREATH: 0
FEVER: 0
LIGHT-HEADEDNESS: 0
DIZZINESS: 0

## 2024-08-03 NOTE — RESULT ENCOUNTER NOTE
Please call mom and let him know that he did test positive for COVID today.    Should he decide that he would like to have an order for Paxlovid, please let me know.    Otherwise, please continue supportive cares that we discussed in clinic today.  Tylenol and ibuprofen as needed for pain and discomfort, rest, increase fluid intake.    Patient should be fever free for 24 hours without any fever reducing medication prior to returning to work and or community events.    Thanks!

## 2024-08-03 NOTE — PATIENT INSTRUCTIONS
You were seen today for COVID-19 exposure  We will call you with results of your COVID/influenza testing.  Recommend supportive care to include Tylenol, ibuprofen as needed for pain and discomfort, rest, increase your fluid intake as able.  We did discuss the use of Paxlovid.  Should you decide that you would like to use this if you are positive for COVID-19, please call and I will place that order for you.  For any acutely worsening concerns or symptoms, please return for reevaluation.

## 2024-08-03 NOTE — PROGRESS NOTES
Subjective      George Upton is a 73 y.o. male who presents for COVID-19 exposure.      George, a 73-year-old individual presents today due to a recent exposure to COVID-19. He reports spending the last two days in close proximity with their brother-in-law, who has tested positive for the virus today. George has developed a dry throat and cough, but denies experiencing fever, dizziness, lightheadedness, chest pain, or shortness of breath. He expresses concern about their symptoms and the potential risk of having contracted the virus, leading to their decision to seek testing. Despite the onset of these symptoms, George reports feeling generally well.            The following have been reviewed and updated as appropriate in this visit:   Allergies  Meds  Problems  Med Hx  Surg Hx  Fam Hx         Current Outpatient Medications   Medication Sig Dispense Refill    atorvastatin (LIPITOR) 20 mg tablet Take 1 tablet (20 mg total) by mouth      Dexcom G6 Sensor device       HumaLOG U-100 Insulin 100 unit/mL injection vial INJECT 55-65 UNITS DAILY VIA INSULIN PUMP      Omnipod 5 G6 Pods, Gen 5, cartridge       irbesartan (AVAPRO) 150 mg tablet Take 1 tablet (150 mg total) by mouth daily      levothyroxine (SYNTHROID, LEVOTHROID) 175 mcg tablet Take 1 tablet (175 mcg total) by mouth daily      nirmatrelvir-ritonavir (PAXLOVID) 300 mg (150 mg x 2)-100 mg tablets Take 2 tablets by mouth 2 (two) times a day for 5 days 20 tablet 0     No current facility-administered medications for this visit.     History reviewed. No pertinent past medical history.  History reviewed. No pertinent surgical history.    Review of Systems   Constitutional:  Negative for fever.   HENT:  Positive for sore throat (Dry throat).         Dry throat     Respiratory:  Positive for cough. Negative for shortness of breath.    Cardiovascular:  Negative for chest pain.   Neurological:  Negative for dizziness, light-headedness and headaches.        Objective   BP (!) 88/50 (BP Location: Left arm, Patient Position: Sitting, Cuff Size: Long Adult)   Pulse 93   Temp 36.5 °C (97.7 °F) (Temporal)   Resp 20   Wt 81.2 kg (179 lb)   SpO2 95%     Physical Exam  Vitals and nursing note reviewed.   Constitutional:       General: He is not in acute distress.     Appearance: Normal appearance. He is not ill-appearing.   HENT:      Nose: No congestion or rhinorrhea.      Mouth/Throat:      Pharynx: Posterior oropharyngeal erythema present. No oropharyngeal exudate.   Eyes:      Pupils: Pupils are equal, round, and reactive to light.   Cardiovascular:      Rate and Rhythm: Normal rate and regular rhythm.      Pulses: Normal pulses.      Heart sounds: Normal heart sounds.   Pulmonary:      Effort: Pulmonary effort is normal. No respiratory distress.      Breath sounds: Normal breath sounds. No wheezing.   Abdominal:      General: Abdomen is flat.      Palpations: Abdomen is soft.   Musculoskeletal:         General: Normal range of motion.      Cervical back: Normal range of motion.   Skin:     General: Skin is warm and dry.   Neurological:      General: No focal deficit present.      Mental Status: He is alert and oriented to person, place, and time.   Psychiatric:         Mood and Affect: Mood normal.         Behavior: Behavior normal.         Recent Results (from the past 24 hour(s))   SARS/INFLUENZA PCR    Collection Time: 08/03/24  2:37 PM    Specimen: Nasopharynx; Swab   Result Value Ref Range    Influenza A Screen by PCR Negative Negative    Influenza B Screen by PCR Negative Negative    COVID-19 PCR Positive (A) Negative     No orders to display        Assessment/Plan   Diagnoses and all orders for this visit:    COVID-19  -     nirmatrelvir-ritonavir (PAXLOVID) 300 mg (150 mg x 2)-100 mg tablets; Take 2 tablets by mouth 2 (two) times a day for 5 days    Acute cough  -     SARS/INFLUENZA PCR        Plan:  George Upton is a 73 y.o. male  who presents  for COVID-19 exposure.  Vital signs today read as blood pressure 88/50, heart rate 93, oxygen saturation 95% on room air.  Patient afebrile at 97.7 degrees.  Did discuss patient's blood pressure with him today stating that it was on the lower side, patient states this is normal for him.  He is not experiencing any lightheadedness, dizziness, visual changes, or increased weakness.    Physical exam performed, and findings discussed with patient.  Lungs clear to auscultation, no rhonchi or rales heard.  Heart rate regular.  Posterior oropharynx erythematous, no exudate noted.    COVID/influenza testing resulted as positive today.  Did discuss Paxlovid with patient.  Discussed that there can be interactions with many medications thus he may have to stop some while taking this 5-day course.  Patient states he will think about this and call clinic if he decides that he wants this medication    Recommend supportive cares to include Tylenol and ibuprofen as needed for pain or discomfort, rest, increase patient's fluid intake as he is able.  Patient can utilize warm salt water gargles, hot steamy showers, and humidification.  Hot tea with honey may also benefit patient's throat.    Educational handout provided.  Return precautions discussed.  Should patient's symptoms acutely worsen, he should be seen by PCP, Urgent Care, or ED for reevaluation.    Patient verbalizes understanding and is in agreement with this plan.     Cortez Mendoza, CNP    Addendum: Patient calls clinic stating he would like to have Paxlovid prescription.  Prescription sent to [facility] pharmacy in [place].  Patient's last GFR was 50, thus renal dosing was prescribed.  Take 2 tablets twice daily x 5 days.  Did have nursing staff call patient to discuss with him.  Also recommend patient stop his atorvastatin while he is taking this medication.  Encourage patient to call PCP or endocrinologist to discuss levothyroxine and Paxlovid as Paxlovid can decrease  efficacy of levothyroxine.    A voice recognition program was used to aid in documentation of this record.  Sometimes words are not printed exactly as they were spoken.  While efforts were made to carefully edit and correct any inaccuracies, some areas may be present; please take these into context.  Please contact the provider if areas are identified.

## 2024-08-04 ASSESSMENT — ENCOUNTER SYMPTOMS: SORE THROAT: 1

## 2024-08-04 NOTE — RESULT ENCOUNTER NOTE
Please call patient and let him know that I did send Paxlovid to [facility] in [place].  Due to medication interactions he should stop his atorvastatin while he is on this medication.  I recommend he reach out to primary care provider and/or endocrinologist to discuss him taking this medication while also taking his thyroid medication.    Lab at the beginning of July showed that his kidney function was a little elevated, thus we are giving him a little bit of a lower dose.  He will take 2 tablets twice daily for 5 days.    For any acutely worsening symptoms, please return for reevaluation.    Thanks!

## (undated) DEVICE — DRAPE BACK TABLE PADDED 60X90

## (undated) DEVICE — SUTURE VICRYL+ 1 27IN CT-2 VLT VCP335H

## (undated) DEVICE — DRAPE OR LAPAROTOMY KC 89228*

## (undated) DEVICE — SUTURE VICRYL+ 2-0 CT-2 27" UND VCP269H

## (undated) DEVICE — SUTURE VICRYL+ 4-0 UNDYED PS-2 VCP496H

## (undated) DEVICE — DECANTER VIAL 2006S

## (undated) DEVICE — TOOL DISSECT MIDAS MR8 14CM MATCH HEAD 3MM MR8-14MH30

## (undated) DEVICE — ESU PENCIL SMOKE EVAC W/ROCKER SWITCH 0703-047-000

## (undated) DEVICE — GOWN IMPERVIOUS BREATHABLE SMART LG 89015

## (undated) DEVICE — ESU GROUND PAD ADULT REM W/15' CORD E7507DB

## (undated) DEVICE — PACK MINOR SINGLE BASIN SSK3001

## (undated) DEVICE — CUSTOM PACK LUMBAR FUSION SNE5BLFHEA

## (undated) DEVICE — CATH TRAY FOLEY SURESTEP 16FR DRAIN BAG STATOCK A899916

## (undated) DEVICE — GLOVE BIOGEL PI INDICATOR 8.0 LF 41680

## (undated) DEVICE — SU STRATAFIX PDS PLUS 1 CT-1 18" SXPP1A404

## (undated) DEVICE — GOWN IMPERVIOUS BREATHABLE SMART XLG 89045

## (undated) DEVICE — CELL SAVER

## (undated) DEVICE — SOL WATER IRRIG 1000ML BOTTLE 2F7114

## (undated) DEVICE — RX SURGIFLO HEMOSTATIC MATRIX 8ML 2991

## (undated) DEVICE — KIT DRAIN CLOSED WOUND SUCTION MED 400ML RESVR

## (undated) DEVICE — GOWN XLG DISP 9545

## (undated) DEVICE — PACK COLD 6 X 10 1-HOUR 0814-0610

## (undated) DEVICE — GLOVE BIOGEL PI SZ 7.0 40870

## (undated) DEVICE — DRSG PRIMAPORE 03 1/8X6" 66000318

## (undated) DEVICE — MARKER SPHERES PASSIVE MEDT PACK 1 8801071

## (undated) DEVICE — BLADE BONE MILL STRK 5.0MM MED 5400-701-000

## (undated) DEVICE — SYR BULB IRRIG DOVER 60 ML LATEX FREE 67000

## (undated) DEVICE — GLOVE BIOGEL PI SZ 8.0 40880

## (undated) DEVICE — CUSHION INSERT LG PRONE VIEW JACKSON TABLE

## (undated) DEVICE — PLATE GROUNDING ADULT W/CORD 9165L

## (undated) DEVICE — SOL NACL 0.9% IRRIG 1000ML BOTTLE 2F7124

## (undated) DEVICE — DRAPE O ARM TUBE 9732722

## (undated) DEVICE — SUCTION MANIFOLD NEPTUNE 2 SYS 1 PORT 702-025-000

## (undated) DEVICE — MARKER SPHERES PASSIVE MEDT PACK 5 8801075

## (undated) DEVICE — GLOVE BIOGEL PI ULTRATOUCH G SZ 7.5 42175

## (undated) DEVICE — Device

## (undated) DEVICE — GLOVE UNDER INDICATOR PI SZ 7.0 LF 41670

## (undated) DEVICE — DRSG BIOPATCH GERMICIDAL SPLIT SPONGE 4MM MED 4150

## (undated) DEVICE — ESU ELEC BLADE 2.75" COATED/INSULATED E1455

## (undated) DEVICE — CUSTOM PACK MINOR SBA5BMNHEA

## (undated) DEVICE — DRSG GAUZE 2X2" TRAY 1806

## (undated) DEVICE — DRAPE TOWEL IMPERVIOUS X2 7553

## (undated) DEVICE — CATH IV ANGIO INTRO 14GA X 1 3/4" 381467

## (undated) DEVICE — POSITIONER ARM CRADLE LAMINECTOMY DISP

## (undated) DEVICE — SU MONOCRYL 3-0 PS-2 18" UND MCP497G

## (undated) DEVICE — WRAP LUMBAR COMPRESS COLD THERAPY 4632P

## (undated) DEVICE — ADH SKIN CLOSURE PREMIERPRO EXOFIN 1.0ML 3470

## (undated) RX ORDER — ONDANSETRON 2 MG/ML
INJECTION INTRAMUSCULAR; INTRAVENOUS
Status: DISPENSED
Start: 2023-03-30

## (undated) RX ORDER — PROPOFOL 10 MG/ML
INJECTION, EMULSION INTRAVENOUS
Status: DISPENSED
Start: 2023-03-30

## (undated) RX ORDER — BUPIVACAINE HYDROCHLORIDE AND EPINEPHRINE 2.5; 5 MG/ML; UG/ML
INJECTION, SOLUTION EPIDURAL; INFILTRATION; INTRACAUDAL; PERINEURAL
Status: DISPENSED
Start: 2024-03-13

## (undated) RX ORDER — LIDOCAINE HYDROCHLORIDE 10 MG/ML
INJECTION, SOLUTION EPIDURAL; INFILTRATION; INTRACAUDAL; PERINEURAL
Status: DISPENSED
Start: 2023-03-30

## (undated) RX ORDER — FENTANYL CITRATE 50 UG/ML
INJECTION, SOLUTION INTRAMUSCULAR; INTRAVENOUS
Status: DISPENSED
Start: 2024-03-13

## (undated) RX ORDER — FENTANYL CITRATE-0.9 % NACL/PF 10 MCG/ML
PLASTIC BAG, INJECTION (ML) INTRAVENOUS
Status: DISPENSED
Start: 2023-03-30

## (undated) RX ORDER — CEFAZOLIN SODIUM 1 G/3ML
INJECTION, POWDER, FOR SOLUTION INTRAMUSCULAR; INTRAVENOUS
Status: DISPENSED
Start: 2023-03-30

## (undated) RX ORDER — DEXAMETHASONE SODIUM PHOSPHATE 10 MG/ML
INJECTION, EMULSION INTRAMUSCULAR; INTRAVENOUS
Status: DISPENSED
Start: 2023-03-30